# Patient Record
Sex: MALE | Race: WHITE | NOT HISPANIC OR LATINO
[De-identification: names, ages, dates, MRNs, and addresses within clinical notes are randomized per-mention and may not be internally consistent; named-entity substitution may affect disease eponyms.]

---

## 2017-01-19 ENCOUNTER — APPOINTMENT (OUTPATIENT)
Dept: ELECTROPHYSIOLOGY | Facility: CLINIC | Age: 82
End: 2017-01-19

## 2017-02-03 ENCOUNTER — RX RENEWAL (OUTPATIENT)
Age: 82
End: 2017-02-03

## 2017-02-06 ENCOUNTER — RX RENEWAL (OUTPATIENT)
Age: 82
End: 2017-02-06

## 2017-03-29 ENCOUNTER — APPOINTMENT (OUTPATIENT)
Dept: ELECTROPHYSIOLOGY | Facility: CLINIC | Age: 82
End: 2017-03-29

## 2017-03-29 ENCOUNTER — NON-APPOINTMENT (OUTPATIENT)
Age: 82
End: 2017-03-29

## 2017-03-29 ENCOUNTER — APPOINTMENT (OUTPATIENT)
Dept: CARDIOLOGY | Facility: CLINIC | Age: 82
End: 2017-03-29

## 2017-03-29 VITALS — SYSTOLIC BLOOD PRESSURE: 118 MMHG | DIASTOLIC BLOOD PRESSURE: 72 MMHG | WEIGHT: 155 LBS | BODY MASS INDEX: 21.62 KG/M2

## 2017-04-14 ENCOUNTER — MEDICATION RENEWAL (OUTPATIENT)
Age: 82
End: 2017-04-14

## 2017-05-04 ENCOUNTER — RX RENEWAL (OUTPATIENT)
Age: 82
End: 2017-05-04

## 2017-06-29 ENCOUNTER — APPOINTMENT (OUTPATIENT)
Dept: ELECTROPHYSIOLOGY | Facility: CLINIC | Age: 82
End: 2017-06-29

## 2017-08-04 ENCOUNTER — RX RENEWAL (OUTPATIENT)
Age: 82
End: 2017-08-04

## 2017-08-07 ENCOUNTER — RX RENEWAL (OUTPATIENT)
Age: 82
End: 2017-08-07

## 2017-08-09 ENCOUNTER — RX RENEWAL (OUTPATIENT)
Age: 82
End: 2017-08-09

## 2017-08-21 ENCOUNTER — APPOINTMENT (OUTPATIENT)
Dept: ELECTROPHYSIOLOGY | Facility: CLINIC | Age: 82
End: 2017-08-21
Payer: MEDICARE

## 2017-08-21 PROCEDURE — 93294 REM INTERROG EVL PM/LDLS PM: CPT

## 2017-10-11 ENCOUNTER — APPOINTMENT (OUTPATIENT)
Dept: ELECTROPHYSIOLOGY | Facility: CLINIC | Age: 82
End: 2017-10-11
Payer: MEDICARE

## 2017-10-11 ENCOUNTER — NON-APPOINTMENT (OUTPATIENT)
Age: 82
End: 2017-10-11

## 2017-10-11 ENCOUNTER — APPOINTMENT (OUTPATIENT)
Dept: CARDIOLOGY | Facility: CLINIC | Age: 82
End: 2017-10-11
Payer: MEDICARE

## 2017-10-11 VITALS
BODY MASS INDEX: 23.1 KG/M2 | SYSTOLIC BLOOD PRESSURE: 134 MMHG | HEIGHT: 71 IN | WEIGHT: 165 LBS | DIASTOLIC BLOOD PRESSURE: 76 MMHG | OXYGEN SATURATION: 100 % | HEART RATE: 62 BPM

## 2017-10-11 PROCEDURE — 99214 OFFICE O/P EST MOD 30 MIN: CPT

## 2017-10-11 PROCEDURE — 93280 PM DEVICE PROGR EVAL DUAL: CPT

## 2017-10-11 PROCEDURE — 93000 ELECTROCARDIOGRAM COMPLETE: CPT

## 2017-10-11 RX ORDER — METOPROLOL SUCCINATE 25 MG/1
25 TABLET, EXTENDED RELEASE ORAL
Qty: 90 | Refills: 3 | Status: DISCONTINUED | COMMUNITY
Start: 2017-04-14 | End: 2017-10-11

## 2017-11-07 ENCOUNTER — RX RENEWAL (OUTPATIENT)
Age: 82
End: 2017-11-07

## 2017-11-15 ENCOUNTER — OTHER (OUTPATIENT)
Age: 82
End: 2017-11-15

## 2017-11-15 RX ORDER — DILTIAZEM HYDROCHLORIDE 240 MG/1
240 CAPSULE, COATED, EXTENDED RELEASE ORAL
Qty: 90 | Refills: 0 | Status: DISCONTINUED | COMMUNITY
Start: 2017-10-11 | End: 2017-11-15

## 2018-01-22 ENCOUNTER — APPOINTMENT (OUTPATIENT)
Dept: ELECTROPHYSIOLOGY | Facility: CLINIC | Age: 83
End: 2018-01-22
Payer: MEDICARE

## 2018-01-22 PROCEDURE — 93294 REM INTERROG EVL PM/LDLS PM: CPT

## 2018-01-22 PROCEDURE — 93296 REM INTERROG EVL PM/IDS: CPT

## 2018-01-31 ENCOUNTER — RX RENEWAL (OUTPATIENT)
Age: 83
End: 2018-01-31

## 2018-02-05 ENCOUNTER — RX RENEWAL (OUTPATIENT)
Age: 83
End: 2018-02-05

## 2018-04-11 ENCOUNTER — APPOINTMENT (OUTPATIENT)
Dept: ELECTROPHYSIOLOGY | Facility: CLINIC | Age: 83
End: 2018-04-11
Payer: MEDICARE

## 2018-04-11 ENCOUNTER — APPOINTMENT (OUTPATIENT)
Dept: CARDIOLOGY | Facility: CLINIC | Age: 83
End: 2018-04-11
Payer: MEDICARE

## 2018-04-11 VITALS
WEIGHT: 155 LBS | OXYGEN SATURATION: 99 % | DIASTOLIC BLOOD PRESSURE: 68 MMHG | BODY MASS INDEX: 21.7 KG/M2 | SYSTOLIC BLOOD PRESSURE: 122 MMHG | HEART RATE: 89 BPM | HEIGHT: 71 IN

## 2018-04-11 PROCEDURE — 99214 OFFICE O/P EST MOD 30 MIN: CPT

## 2018-04-11 PROCEDURE — 93000 ELECTROCARDIOGRAM COMPLETE: CPT

## 2018-04-11 PROCEDURE — 93280 PM DEVICE PROGR EVAL DUAL: CPT

## 2018-04-12 ENCOUNTER — APPOINTMENT (OUTPATIENT)
Dept: CARDIOLOGY | Facility: CLINIC | Age: 83
End: 2018-04-12

## 2018-04-12 LAB
ALBUMIN SERPL ELPH-MCNC: 4.7 G/DL
ALP BLD-CCNC: 47 U/L
ALT SERPL-CCNC: 20 U/L
ANION GAP SERPL CALC-SCNC: 17 MMOL/L
AST SERPL-CCNC: 39 U/L
BILIRUB SERPL-MCNC: 0.5 MG/DL
BUN SERPL-MCNC: 13 MG/DL
CALCIUM SERPL-MCNC: 9.8 MG/DL
CHLORIDE SERPL-SCNC: 102 MMOL/L
CHOLEST SERPL-MCNC: 138 MG/DL
CHOLEST/HDLC SERPL: 1.7 RATIO
CO2 SERPL-SCNC: 24 MMOL/L
CREAT SERPL-MCNC: 1.1 MG/DL
GLUCOSE SERPL-MCNC: 107 MG/DL
HBA1C MFR BLD HPLC: 5.5 %
HDLC SERPL-MCNC: 83 MG/DL
LDLC SERPL CALC-MCNC: 45 MG/DL
NT-PROBNP SERPL-MCNC: 138 PG/ML
POTASSIUM SERPL-SCNC: 4.2 MMOL/L
PROT SERPL-MCNC: 7.8 G/DL
SODIUM SERPL-SCNC: 143 MMOL/L
TRIGL SERPL-MCNC: 52 MG/DL
TSH SERPL-ACNC: 1.74 UIU/ML

## 2018-07-19 ENCOUNTER — MEDICATION RENEWAL (OUTPATIENT)
Age: 83
End: 2018-07-19

## 2018-08-06 ENCOUNTER — APPOINTMENT (OUTPATIENT)
Dept: ELECTROPHYSIOLOGY | Facility: CLINIC | Age: 83
End: 2018-08-06

## 2018-10-11 ENCOUNTER — APPOINTMENT (OUTPATIENT)
Dept: ELECTROPHYSIOLOGY | Facility: CLINIC | Age: 83
End: 2018-10-11
Payer: MEDICARE

## 2018-10-11 ENCOUNTER — APPOINTMENT (OUTPATIENT)
Dept: CARDIOLOGY | Facility: CLINIC | Age: 83
End: 2018-10-11
Payer: MEDICARE

## 2018-10-11 ENCOUNTER — NON-APPOINTMENT (OUTPATIENT)
Age: 83
End: 2018-10-11

## 2018-10-11 VITALS
DIASTOLIC BLOOD PRESSURE: 61 MMHG | BODY MASS INDEX: 21.7 KG/M2 | SYSTOLIC BLOOD PRESSURE: 129 MMHG | HEART RATE: 71 BPM | OXYGEN SATURATION: 97 % | WEIGHT: 155 LBS | HEIGHT: 71 IN

## 2018-10-11 PROCEDURE — 99215 OFFICE O/P EST HI 40 MIN: CPT

## 2018-10-11 PROCEDURE — 99214 OFFICE O/P EST MOD 30 MIN: CPT

## 2018-10-11 PROCEDURE — 93280 PM DEVICE PROGR EVAL DUAL: CPT

## 2018-10-11 PROCEDURE — 93000 ELECTROCARDIOGRAM COMPLETE: CPT

## 2018-10-29 ENCOUNTER — APPOINTMENT (OUTPATIENT)
Dept: ELECTROPHYSIOLOGY | Facility: CLINIC | Age: 83
End: 2018-10-29
Payer: MEDICARE

## 2018-10-29 ENCOUNTER — RX RENEWAL (OUTPATIENT)
Age: 83
End: 2018-10-29

## 2018-10-29 ENCOUNTER — APPOINTMENT (OUTPATIENT)
Dept: CV DIAGNOSITCS | Facility: HOSPITAL | Age: 83
End: 2018-10-29

## 2018-10-29 ENCOUNTER — NON-APPOINTMENT (OUTPATIENT)
Age: 83
End: 2018-10-29

## 2018-10-29 ENCOUNTER — OUTPATIENT (OUTPATIENT)
Dept: OUTPATIENT SERVICES | Facility: HOSPITAL | Age: 83
LOS: 1 days | End: 2018-10-29
Payer: MEDICARE

## 2018-10-29 VITALS
DIASTOLIC BLOOD PRESSURE: 72 MMHG | HEIGHT: 71 IN | OXYGEN SATURATION: 97 % | HEART RATE: 68 BPM | BODY MASS INDEX: 22.4 KG/M2 | SYSTOLIC BLOOD PRESSURE: 144 MMHG | WEIGHT: 160 LBS

## 2018-10-29 DIAGNOSIS — I10 ESSENTIAL (PRIMARY) HYPERTENSION: ICD-10-CM

## 2018-10-29 PROCEDURE — 93306 TTE W/DOPPLER COMPLETE: CPT | Mod: 26

## 2018-10-29 PROCEDURE — 99214 OFFICE O/P EST MOD 30 MIN: CPT | Mod: 25

## 2018-10-29 PROCEDURE — 93306 TTE W/DOPPLER COMPLETE: CPT

## 2018-10-29 PROCEDURE — 93280 PM DEVICE PROGR EVAL DUAL: CPT

## 2018-10-29 PROCEDURE — 93000 ELECTROCARDIOGRAM COMPLETE: CPT | Mod: 59

## 2018-11-05 ENCOUNTER — RX RENEWAL (OUTPATIENT)
Age: 83
End: 2018-11-05

## 2019-02-07 ENCOUNTER — APPOINTMENT (OUTPATIENT)
Dept: CARDIOLOGY | Facility: CLINIC | Age: 84
End: 2019-02-07
Payer: MEDICARE

## 2019-02-07 ENCOUNTER — NON-APPOINTMENT (OUTPATIENT)
Age: 84
End: 2019-02-07

## 2019-02-07 VITALS
WEIGHT: 155 LBS | HEIGHT: 71 IN | SYSTOLIC BLOOD PRESSURE: 131 MMHG | DIASTOLIC BLOOD PRESSURE: 74 MMHG | OXYGEN SATURATION: 99 % | BODY MASS INDEX: 21.7 KG/M2 | HEART RATE: 78 BPM

## 2019-02-07 PROCEDURE — 99214 OFFICE O/P EST MOD 30 MIN: CPT

## 2019-02-07 PROCEDURE — 93000 ELECTROCARDIOGRAM COMPLETE: CPT

## 2019-02-07 NOTE — PHYSICAL EXAM
[General Appearance - Well Developed] : well developed [Normal Appearance] : normal appearance [Well Groomed] : well groomed [General Appearance - Well Nourished] : well nourished [No Deformities] : no deformities [General Appearance - In No Acute Distress] : no acute distress [Normal Conjunctiva] : the conjunctiva exhibited no abnormalities [Eyelids - No Xanthelasma] : the eyelids demonstrated no xanthelasmas [Normal Oral Mucosa] : normal oral mucosa [No Oral Pallor] : no oral pallor [No Oral Cyanosis] : no oral cyanosis [Normal Jugular Venous A Waves Present] : normal jugular venous A waves present [Normal Jugular Venous V Waves Present] : normal jugular venous V waves present [No Jugular Venous Langston A Waves] : no jugular venous langston A waves [Respiration, Rhythm And Depth] : normal respiratory rhythm and effort [Exaggerated Use Of Accessory Muscles For Inspiration] : no accessory muscle use [Auscultation Breath Sounds / Voice Sounds] : lungs were clear to auscultation bilaterally [Heart Rate And Rhythm] : heart rate and rhythm were normal [Heart Sounds] : normal S1 and S2 [Murmurs] : no murmurs present [Abdomen Soft] : soft [Abdomen Tenderness] : non-tender [Abdomen Mass (___ Cm)] : no abdominal mass palpated [Abnormal Walk] : normal gait [Gait - Sufficient For Exercise Testing] : the gait was sufficient for exercise testing [Nail Clubbing] : no clubbing of the fingernails [Cyanosis, Localized] : no localized cyanosis [Petechial Hemorrhages (___cm)] : no petechial hemorrhages [Skin Color & Pigmentation] : normal skin color and pigmentation [] : no rash [No Venous Stasis] : no venous stasis [Skin Lesions] : no skin lesions [No Skin Ulcers] : no skin ulcer [No Xanthoma] : no  xanthoma was observed [Oriented To Time, Place, And Person] : oriented to person, place, and time [Affect] : the affect was normal [Mood] : the mood was normal [No Anxiety] : not feeling anxious

## 2019-02-10 NOTE — HISTORY OF PRESENT ILLNESS
[FreeTextEntry1] : Garth is returning to the offices for a routine assessment.\par He is feeling weak and tired\par No chest pains or FLORES\par PPM programming changed - Seen by Dr. Newsome\par \par  \par

## 2019-02-10 NOTE — REVIEW OF SYSTEMS
[Feeling Fatigued] : feeling fatigued [Skin Lesions] : skin lesion(s): [Negative] : Heme/Lymph [Depression] : no depression [Suicidal] : not suicidal

## 2019-02-10 NOTE — DISCUSSION/SUMMARY
[___ Month(s)] : [unfilled] month(s) [FreeTextEntry1] : Mr. Armstrong is a 83 y/o with HTN, Hchol, PAF, PPM  here for a follow-up.  \par \par \par Will reduce beta blocker to 1/2 dose to assess if this is the cause of his symptoms\par Will contact me in 1 week to review how he feels\par May need PPM adjustment

## 2019-02-14 ENCOUNTER — OTHER (OUTPATIENT)
Age: 84
End: 2019-02-14

## 2019-04-20 ENCOUNTER — EMERGENCY (EMERGENCY)
Facility: HOSPITAL | Age: 84
LOS: 1 days | Discharge: HOME | End: 2019-04-20
Attending: EMERGENCY MEDICINE
Payer: MEDICARE

## 2019-04-20 VITALS
DIASTOLIC BLOOD PRESSURE: 81 MMHG | OXYGEN SATURATION: 97 % | SYSTOLIC BLOOD PRESSURE: 144 MMHG | HEART RATE: 71 BPM | TEMPERATURE: 98 F | RESPIRATION RATE: 16 BRPM

## 2019-04-20 VITALS
DIASTOLIC BLOOD PRESSURE: 69 MMHG | WEIGHT: 154.98 LBS | RESPIRATION RATE: 18 BRPM | SYSTOLIC BLOOD PRESSURE: 161 MMHG | HEIGHT: 71 IN | OXYGEN SATURATION: 98 % | HEART RATE: 63 BPM | TEMPERATURE: 98 F

## 2019-04-20 DIAGNOSIS — Z95.0 PRESENCE OF CARDIAC PACEMAKER: Chronic | ICD-10-CM

## 2019-04-20 LAB
ALBUMIN SERPL ELPH-MCNC: 4.5 G/DL — SIGNIFICANT CHANGE UP (ref 3.3–5)
ALP SERPL-CCNC: 41 U/L — SIGNIFICANT CHANGE UP (ref 40–120)
ALT FLD-CCNC: 18 U/L — SIGNIFICANT CHANGE UP (ref 10–45)
ANION GAP SERPL CALC-SCNC: 14 MMOL/L — SIGNIFICANT CHANGE UP (ref 5–17)
APTT BLD: 36.8 SEC — HIGH (ref 27.5–36.3)
AST SERPL-CCNC: 31 U/L — SIGNIFICANT CHANGE UP (ref 10–40)
BASOPHILS # BLD AUTO: 0.1 K/UL — SIGNIFICANT CHANGE UP (ref 0–0.2)
BASOPHILS NFR BLD AUTO: 0.6 % — SIGNIFICANT CHANGE UP (ref 0–2)
BILIRUB SERPL-MCNC: 0.5 MG/DL — SIGNIFICANT CHANGE UP (ref 0.2–1.2)
BUN SERPL-MCNC: 15 MG/DL — SIGNIFICANT CHANGE UP (ref 7–23)
CALCIUM SERPL-MCNC: 9.5 MG/DL — SIGNIFICANT CHANGE UP (ref 8.4–10.5)
CHLORIDE SERPL-SCNC: 104 MMOL/L — SIGNIFICANT CHANGE UP (ref 96–108)
CO2 SERPL-SCNC: 26 MMOL/L — SIGNIFICANT CHANGE UP (ref 22–31)
CREAT SERPL-MCNC: 1.01 MG/DL — SIGNIFICANT CHANGE UP (ref 0.5–1.3)
EOSINOPHIL # BLD AUTO: 0 K/UL — SIGNIFICANT CHANGE UP (ref 0–0.5)
EOSINOPHIL NFR BLD AUTO: 0.4 % — SIGNIFICANT CHANGE UP (ref 0–6)
GLUCOSE SERPL-MCNC: 114 MG/DL — HIGH (ref 70–99)
HCT VFR BLD CALC: 40.9 % — SIGNIFICANT CHANGE UP (ref 39–50)
HGB BLD-MCNC: 13.5 G/DL — SIGNIFICANT CHANGE UP (ref 13–17)
INR BLD: 1.86 RATIO — HIGH (ref 0.88–1.16)
LYMPHOCYTES # BLD AUTO: 1.5 K/UL — SIGNIFICANT CHANGE UP (ref 1–3.3)
LYMPHOCYTES # BLD AUTO: 18.7 % — SIGNIFICANT CHANGE UP (ref 13–44)
MCHC RBC-ENTMCNC: 30.8 PG — SIGNIFICANT CHANGE UP (ref 27–34)
MCHC RBC-ENTMCNC: 33.1 GM/DL — SIGNIFICANT CHANGE UP (ref 32–36)
MCV RBC AUTO: 93 FL — SIGNIFICANT CHANGE UP (ref 80–100)
MONOCYTES # BLD AUTO: 0.4 K/UL — SIGNIFICANT CHANGE UP (ref 0–0.9)
MONOCYTES NFR BLD AUTO: 5.3 % — SIGNIFICANT CHANGE UP (ref 2–14)
NEUTROPHILS # BLD AUTO: 5.9 K/UL — SIGNIFICANT CHANGE UP (ref 1.8–7.4)
NEUTROPHILS NFR BLD AUTO: 75 % — SIGNIFICANT CHANGE UP (ref 43–77)
PLATELET # BLD AUTO: 199 K/UL — SIGNIFICANT CHANGE UP (ref 150–400)
POTASSIUM SERPL-MCNC: 4.1 MMOL/L — SIGNIFICANT CHANGE UP (ref 3.5–5.3)
POTASSIUM SERPL-SCNC: 4.1 MMOL/L — SIGNIFICANT CHANGE UP (ref 3.5–5.3)
PROT SERPL-MCNC: 7.1 G/DL — SIGNIFICANT CHANGE UP (ref 6–8.3)
PROTHROM AB SERPL-ACNC: 21.8 SEC — HIGH (ref 10–12.9)
RBC # BLD: 4.39 M/UL — SIGNIFICANT CHANGE UP (ref 4.2–5.8)
RBC # FLD: 12.8 % — SIGNIFICANT CHANGE UP (ref 10.3–14.5)
SODIUM SERPL-SCNC: 144 MMOL/L — SIGNIFICANT CHANGE UP (ref 135–145)
TSH SERPL-MCNC: 1.65 UIU/ML — SIGNIFICANT CHANGE UP (ref 0.27–4.2)
WBC # BLD: 7.9 K/UL — SIGNIFICANT CHANGE UP (ref 3.8–10.5)
WBC # FLD AUTO: 7.9 K/UL — SIGNIFICANT CHANGE UP (ref 3.8–10.5)

## 2019-04-20 PROCEDURE — 71260 CT THORAX DX C+: CPT | Mod: 26

## 2019-04-20 PROCEDURE — 71260 CT THORAX DX C+: CPT

## 2019-04-20 PROCEDURE — 99284 EMERGENCY DEPT VISIT MOD MDM: CPT | Mod: 25

## 2019-04-20 PROCEDURE — 85610 PROTHROMBIN TIME: CPT

## 2019-04-20 PROCEDURE — 85730 THROMBOPLASTIN TIME PARTIAL: CPT

## 2019-04-20 PROCEDURE — 80053 COMPREHEN METABOLIC PANEL: CPT

## 2019-04-20 PROCEDURE — 84443 ASSAY THYROID STIM HORMONE: CPT

## 2019-04-20 PROCEDURE — 93281 PM DEVICE PROGR EVAL MULTI: CPT | Mod: 26,GC

## 2019-04-20 PROCEDURE — 84436 ASSAY OF TOTAL THYROXINE: CPT

## 2019-04-20 PROCEDURE — 93005 ELECTROCARDIOGRAM TRACING: CPT

## 2019-04-20 PROCEDURE — 71046 X-RAY EXAM CHEST 2 VIEWS: CPT

## 2019-04-20 PROCEDURE — 99284 EMERGENCY DEPT VISIT MOD MDM: CPT | Mod: GC

## 2019-04-20 PROCEDURE — 85027 COMPLETE CBC AUTOMATED: CPT

## 2019-04-20 PROCEDURE — 71046 X-RAY EXAM CHEST 2 VIEWS: CPT | Mod: 26

## 2019-04-20 NOTE — ED PROVIDER NOTE - PMH
CAD (coronary artery disease)    Gout    HLD (hyperlipidemia)    HTN (hypertension)    Paroxysmal atrial fibrillation

## 2019-04-20 NOTE — ED PROVIDER NOTE - CLINICAL SUMMARY MEDICAL DECISION MAKING FREE TEXT BOX
Attending MD Almeida:  PAF (on Eloquis), PPM, HTN, HLD, CAD, gout, SA lelo dysfunction presents with weakness or unsteady gait for the last few days.  He feels more weak than normal.  Similar in February and metoprolol dose reduced.  He also feels unsteady on his feet.  Family says mental status at baseline, no slurred speech or confusion. Not orthostatic on Vital signs. Attending MD Almeida: A & O x 3, NAD, HEENT WNL and no facial asymmetry; lungs CTAB, heart with reg rhythm without murmur; abdomen soft NTND; extremities with no edema; skin with no rashes, neuro exam non focal with no motor or sensory deficits. DDX  Hypothyroid, UTI vs other infection.  Plan:  PPM interrogation, labs, UA.

## 2019-04-20 NOTE — ED PROVIDER NOTE - OBJECTIVE STATEMENT
83 y/o M w/ PMH of paroxysmal a fib on eliquis, CAD, HLD, HTN, Gout presenting w/ weakness. Pt presents with multiple family members. Pt reports over the past 4 days 85 y/o M w/ PMH of paroxysmal a fib on eliquis, CAD, HLD, HTN, Gout presenting w/ weakness. Pt presents with multiple family members. Pt reports over the past 4 days has been having increased fatigue and weakness. States he is normally very active and does housework and yard work without much difficulty. Over this time though he has been unable to be as active as he usual and gets very tired. His fatigue is normally relieved with brief rest but that has not been the case lately. States he feel like he is not walking normally and feels off balance when turning while he walks. Family members report no change in speech or behavior. He reports his pacemaker settings being changed in October 2018 and since then he says has not quite felt himself. Saw his cardiologist Dr. Gaspar in February and his beta blocker dose was lowered in attempt to improve his symptoms. Pt states this has not helped. Not complaining of any pain. Recent sinus infection, but aside from that no other recent illnesses. No additional complaints.

## 2019-04-20 NOTE — ED ADULT NURSE REASSESSMENT NOTE - NS ED NURSE REASSESS COMMENT FT1
Pt reports he is comfortable at this time. Awaiting cardiology consult. Pt aware of plan of care. Safety and comfort measures provided.

## 2019-04-20 NOTE — ED ADULT NURSE REASSESSMENT NOTE - NS ED NURSE REASSESS COMMENT FT1
Report received from Natasha ARTIS . Pt AAOx4, NAD, resp nonlabored, skin warm/dry, resting comfortably in bed with family at bedside. Pt. endorses feeling a little flush after CT scan with contrast. NO signs of allergic reaction present.  Pt denies headache, dizziness, chest pain, palpitations, SOB, abd pain, n/v/d, urinary symptoms, fevers, chills, weakness at this time. Pt awaiting CT scan results . Safety maintained.

## 2019-04-20 NOTE — ED PROVIDER NOTE - NSFOLLOWUPCLINICS_GEN_ALL_ED_FT
[facility] Pulmonolgy and Sleep Medicine  Pulmonology  [address] [place] [address], [facility] [address]  [place] [address]  Phone: [phone]  Fax:   Follow Up Time: 7-10 Days

## 2019-04-20 NOTE — ED PROVIDER NOTE - ATTENDING CONTRIBUTION TO CARE
Attending MD Almeida:  I personally have seen and examined this patient.  Resident note reviewed and agree on plan of care and except where noted.  See MDM for details.

## 2019-04-20 NOTE — ED PROVIDER NOTE - NS ED ROS FT
GENERAL: No fever or chills. +fatigue, +weakness  EYES: No change in vision  HEENT: No trouble swallowing or speaking  CARDIAC: No chest pain  PULMONARY: No cough or SOB  GI: No abdominal pain, no nausea or no vomiting, no diarrhea or constipation  : No changes in urination  SKIN: No rashes  NEURO: No headache, no numbness  MSK: No joint pain  Otherwise as HPI or negative.

## 2019-04-20 NOTE — ED PROVIDER NOTE - PHYSICAL EXAMINATION
Gen: NAD, AOx3, able to make needs known, non-toxic  Head: NCAT  HEENT: EOMI, oral mucosa moist, normal conjunctiva  Lung: CTAB, no respiratory distress, no wheezes/rhonchi/rales B/L, speaking in full sentences  CV: irregular, no murmurs  Abd: soft, NTND, no guarding  MSK: no visible deformities  Neuro: CN 2-12 intact. Finger to nose normal. rapid alternating movement normal. Normal romberg. Gait normal w/o ataxia. Speech normal. No facial droop. No focal sensory or motor deficits  Skin: Warm, well perfused  Psych: normal affect

## 2019-04-20 NOTE — ED PROVIDER NOTE - PROGRESS NOTE DETAILS
Dr. Niko Flores, PGY-1: pacemaker interrogated by cards fellow. Reports it functioning correctly. Change made in October 2018 was to help preserve pacemaker battery life. CXR showing R sided pleural effusion. Concern for malignancy given pt's symptoms and this effusion. CT chest ordered to eval for cause of effusion. Garth Winston MD, FACEP patient stable, right sided pleural effusion is new and conveyed to patient and family. Patient with capacity and insight into situation, treatment, risks, benefits, alternative therapies, and understands they can ask any questions if needed.   CT chest noted with asbestos correlated findings, patient to  follow up with primary medical doctor. No immediate life threatening issues present on history or clinical exam. Patient is a safe disposition home, has capacity and insight into their condition, is ambulatory in the Emergency Department with no further questions and will follow up with their doctor(s) this week. Patient and family understand anticipatory guidance were given strict return and follow up precautions.  The patient and family have been informed of all concerning signs and symptoms to return to Emergency Department, the necessity to follow up with the PMD/Clinic/follow up provided within 2-3 days was explained, and the patient and/or family reports understanding of above with capacity and insight. Deloris Kirkland MD, PGY1: Patient received at resident sign out. Pt feels better, results of the CT findings were discussed with the pt and the family. Pt will be followed up by PCP and/or pulmonologist. I have given the pt strict return and follow up precautions. The patient has been provided with a copy of all pertinent results. The patient has been informed of all concerning signs and symptoms to return to Emergency Department, the necessity to follow up with PMD/Clinic/follow up provided within 2-3 days was explained, and the patient reports understanding of above with capacity and insight.

## 2019-04-20 NOTE — ED ADULT NURSE REASSESSMENT NOTE - NS ED NURSE REASSESS COMMENT FT1
Pt aware urine is needed at this time. Awaiting lab results. Safety and comfort measures maintained. Pt denies any pain at this time. Call bell within reach. Family at bedside.

## 2019-04-21 NOTE — PROCEDURE NOTE - INTERROGATION NOTE: COMMENTS
1. No arrhythmias 2. Impedance of Ventricular Lead high (2073) and stable/known 1. No arrhythmias 2. Impedance of Ventricular Lead high (2073) and stable/known 3. No programming performed

## 2019-04-22 ENCOUNTER — OTHER (OUTPATIENT)
Age: 84
End: 2019-04-22

## 2019-04-22 ENCOUNTER — NON-APPOINTMENT (OUTPATIENT)
Age: 84
End: 2019-04-22

## 2019-04-22 ENCOUNTER — APPOINTMENT (OUTPATIENT)
Dept: ELECTROPHYSIOLOGY | Facility: CLINIC | Age: 84
End: 2019-04-22
Payer: MEDICARE

## 2019-04-22 ENCOUNTER — APPOINTMENT (OUTPATIENT)
Dept: CARDIOLOGY | Facility: CLINIC | Age: 84
End: 2019-04-22
Payer: MEDICARE

## 2019-04-22 VITALS
HEIGHT: 71 IN | WEIGHT: 144 LBS | DIASTOLIC BLOOD PRESSURE: 70 MMHG | HEART RATE: 64 BPM | OXYGEN SATURATION: 98 % | SYSTOLIC BLOOD PRESSURE: 128 MMHG | BODY MASS INDEX: 20.16 KG/M2

## 2019-04-22 PROBLEM — I10 ESSENTIAL (PRIMARY) HYPERTENSION: Chronic | Status: ACTIVE | Noted: 2019-04-20

## 2019-04-22 PROBLEM — I25.10 ATHEROSCLEROTIC HEART DISEASE OF NATIVE CORONARY ARTERY WITHOUT ANGINA PECTORIS: Chronic | Status: ACTIVE | Noted: 2019-04-20

## 2019-04-22 PROBLEM — E78.5 HYPERLIPIDEMIA, UNSPECIFIED: Chronic | Status: ACTIVE | Noted: 2019-04-20

## 2019-04-22 PROBLEM — M10.9 GOUT, UNSPECIFIED: Chronic | Status: ACTIVE | Noted: 2019-04-20

## 2019-04-22 LAB
ALBUMIN SERPL ELPH-MCNC: 4.8 G/DL
ALP BLD-CCNC: 51 U/L
ALT SERPL-CCNC: 19 U/L
ANION GAP SERPL CALC-SCNC: 12 MMOL/L
AST SERPL-CCNC: 31 U/L
BILIRUB SERPL-MCNC: 0.5 MG/DL
BUN SERPL-MCNC: 16 MG/DL
CALCIUM SERPL-MCNC: 9.5 MG/DL
CHLORIDE SERPL-SCNC: 105 MMOL/L
CK SERPL-CCNC: 133 U/L
CO2 SERPL-SCNC: 27 MMOL/L
CREAT SERPL-MCNC: 0.97 MG/DL
ERYTHROCYTE [SEDIMENTATION RATE] IN BLOOD BY WESTERGREN METHOD: 21 MM/HR
GLUCOSE SERPL-MCNC: 108 MG/DL
POTASSIUM SERPL-SCNC: 4.1 MMOL/L
PROT SERPL-MCNC: 7.2 G/DL
SODIUM SERPL-SCNC: 144 MMOL/L
TSH SERPL-ACNC: 1.61 UIU/ML

## 2019-04-22 PROCEDURE — 99214 OFFICE O/P EST MOD 30 MIN: CPT

## 2019-04-22 PROCEDURE — 93280 PM DEVICE PROGR EVAL DUAL: CPT

## 2019-04-22 PROCEDURE — 93000 ELECTROCARDIOGRAM COMPLETE: CPT

## 2019-04-22 NOTE — PHYSICAL EXAM
[General Appearance - Well Developed] : well developed [Normal Appearance] : normal appearance [Well Groomed] : well groomed [No Deformities] : no deformities [General Appearance - Well Nourished] : well nourished [General Appearance - In No Acute Distress] : no acute distress [Normal Conjunctiva] : the conjunctiva exhibited no abnormalities [Eyelids - No Xanthelasma] : the eyelids demonstrated no xanthelasmas [Normal Oral Mucosa] : normal oral mucosa [No Oral Pallor] : no oral pallor [Normal Jugular Venous A Waves Present] : normal jugular venous A waves present [No Oral Cyanosis] : no oral cyanosis [No Jugular Venous Langston A Waves] : no jugular venous langston A waves [Normal Jugular Venous V Waves Present] : normal jugular venous V waves present [Respiration, Rhythm And Depth] : normal respiratory rhythm and effort [Exaggerated Use Of Accessory Muscles For Inspiration] : no accessory muscle use [Heart Rate And Rhythm] : heart rate and rhythm were normal [Heart Sounds] : normal S1 and S2 [Auscultation Breath Sounds / Voice Sounds] : lungs were clear to auscultation bilaterally [Murmurs] : no murmurs present [Abdomen Soft] : soft [Abdomen Tenderness] : non-tender [Abdomen Mass (___ Cm)] : no abdominal mass palpated [Abnormal Walk] : normal gait [Gait - Sufficient For Exercise Testing] : the gait was sufficient for exercise testing [Nail Clubbing] : no clubbing of the fingernails [Petechial Hemorrhages (___cm)] : no petechial hemorrhages [Cyanosis, Localized] : no localized cyanosis [Skin Color & Pigmentation] : normal skin color and pigmentation [] : no rash [No Venous Stasis] : no venous stasis [Skin Lesions] : no skin lesions [No Skin Ulcers] : no skin ulcer [No Xanthoma] : no  xanthoma was observed [Oriented To Time, Place, And Person] : oriented to person, place, and time [Affect] : the affect was normal [Mood] : the mood was normal [No Anxiety] : not feeling anxious

## 2019-04-26 NOTE — REASON FOR VISIT
[Follow-Up - Clinic] : a clinic follow-up of [Atrial Fibrillation] : atrial fibrillation [Hypertension] : hypertension [Medication Management] : Medication management [Dizziness] : dizziness [Fatigue] : feeling tired (fatigue)

## 2019-04-26 NOTE — HISTORY OF PRESENT ILLNESS
[FreeTextEntry1] : Garth is returning feeling weak in his legs and fatigue (happens episodically, but has happened more often)\par No syncope\par But family has had to bring him to the ER recently because he was confused and thought he was having a stroke)\par No CP\par No new meds\par No Le edema\par No orthopnea\par \par PPM check reveals episodes of AF and noncapture\par \par \par  \par

## 2019-04-26 NOTE — DISCUSSION/SUMMARY
[___ Month(s)] : [unfilled] month(s) [FreeTextEntry1] : Mr. Armstrong is a 85 y/o with HTN, Hchol, PAF, PPM  here for a follow-up - not feeling well with fatigue, dizziness and leg weakness\par \par 1- stop lipitor for 4 weeks\par 2- check labs\par 3- given PPM check will stop multaq and dig and change to Amio (load reviewed with pt)\par 4- f/u with Jerod for lead extraction and re-implant\par 5- F/u 4 weeks\par

## 2019-04-29 ENCOUNTER — APPOINTMENT (OUTPATIENT)
Dept: ELECTROPHYSIOLOGY | Facility: CLINIC | Age: 84
End: 2019-04-29
Payer: MEDICARE

## 2019-04-29 ENCOUNTER — MEDICATION RENEWAL (OUTPATIENT)
Age: 84
End: 2019-04-29

## 2019-04-29 VITALS
DIASTOLIC BLOOD PRESSURE: 80 MMHG | SYSTOLIC BLOOD PRESSURE: 150 MMHG | WEIGHT: 145 LBS | OXYGEN SATURATION: 98 % | BODY MASS INDEX: 20.3 KG/M2 | HEART RATE: 77 BPM | HEIGHT: 71 IN

## 2019-04-29 PROCEDURE — 93280 PM DEVICE PROGR EVAL DUAL: CPT

## 2019-04-29 PROCEDURE — 93000 ELECTROCARDIOGRAM COMPLETE: CPT | Mod: 59

## 2019-04-29 PROCEDURE — 99214 OFFICE O/P EST MOD 30 MIN: CPT | Mod: 25

## 2019-05-04 NOTE — REASON FOR VISIT
[Pacemaker Evaluation] : pacemaker ~T evaluation ~C was performed [Follow-Up - Clinic] : a clinic follow-up of [Atrial Fibrillation] : atrial fibrillation

## 2019-05-04 NOTE — HISTORY OF PRESENT ILLNESS
[FreeTextEntry1] : I had the pleasure of seeing your patient Garth Armstrong today in the arrhythmia clinic of [facility]. As you well know the patient is an 84-year-old gentleman with tachybradycardia syndrome status post pacemaker placement. The patient underwent initial pacemaker implantation on January 4, 2011. In followup he had been doing well but it has been noticed that he has progressive increases in both his impedance and threshold on the ventricular lead. This is true in both unipolar and bipolar mode. Historically he has been having age of fibrillation in the mid teens as an overall percentage which appears to be fairly asymptomatic. He is on Xarelto for anticoagulation. He comes in today for consultation concerning how to manage his ventricular pacing lead. \par \par I had seen him last the patient's RV lead has a very high threshold but with atrial pacing he had one-to-one conduction up to 90 beats per minute. . He turns to clinic today for followup.\par \par Today in clinic he is overall doing well. His blood pressure 120/70 his pulse was 64 and regular. His EKG demonstrated atrial pacing and intact conduction with a OH interval of 178 beats per minute. Apparently when he goes slow he has been doing some ventricular pacing and atrial fibrillation. We therefore changed his AV delay to decrease V. pacing in sinus rhythm and will switch him to amiodarone as opposed to Dronadarone to try and maintain sinus rhythm and prevent him from having recurrent slow heart rates during Af and requiring V. pacing. Interrogation of his Medtronic dual-chamber pacemaker demonstrates a battery longevity of 21 months. The P wave was undetectable as he was paced. The impedance was 390 ohms and threshold was 0.375 V at 0.4 ms. The RV threshold was 16 mV with impedance of 2227 ohms.

## 2019-05-04 NOTE — DISCUSSION/SUMMARY
[FreeTextEntry1] : In summary the patient is an 84-year-old gentleman with tachybradycardia syndrome. His RV lead is failing. While he is in sinus rhythm he is atrially paced and does well. We took the liberty of switching him to amiodarone to try and maintain sinus rhythm and avoid V. pacing. If however he continues to have higher incidences of atrial fibrillation and bradycardia her hand we'll be forced to revise his system and had a ventricular lead. The patient is hopeful this is not the case because he very much wants to avoid any procedures.

## 2019-06-18 ENCOUNTER — APPOINTMENT (OUTPATIENT)
Dept: PULMONOLOGY | Facility: CLINIC | Age: 84
End: 2019-06-18
Payer: MEDICARE

## 2019-06-18 VITALS
OXYGEN SATURATION: 98 % | BODY MASS INDEX: 20.3 KG/M2 | HEART RATE: 68 BPM | WEIGHT: 145 LBS | HEIGHT: 71 IN | SYSTOLIC BLOOD PRESSURE: 140 MMHG | DIASTOLIC BLOOD PRESSURE: 70 MMHG

## 2019-06-18 DIAGNOSIS — A15.0 TUBERCULOSIS OF LUNG: ICD-10-CM

## 2019-06-18 DIAGNOSIS — M10.9 GOUT, UNSPECIFIED: ICD-10-CM

## 2019-06-18 DIAGNOSIS — Z85.828 PERSONAL HISTORY OF OTHER MALIGNANT NEOPLASM OF SKIN: ICD-10-CM

## 2019-06-18 PROCEDURE — 94729 DIFFUSING CAPACITY: CPT

## 2019-06-18 PROCEDURE — 94727 GAS DIL/WSHOT DETER LNG VOL: CPT

## 2019-06-18 PROCEDURE — 85018 HEMOGLOBIN: CPT | Mod: QW

## 2019-06-18 PROCEDURE — 99204 OFFICE O/P NEW MOD 45 MIN: CPT | Mod: 25

## 2019-06-18 PROCEDURE — 94010 BREATHING CAPACITY TEST: CPT

## 2019-06-18 NOTE — PROCEDURE
[FreeTextEntry1] : PFT: no obstruction. no restriction. DLCO normal. \par ------------------\par EXAM: CT CHEST IC \par \par \par PROCEDURE DATE: 04/20/2019 \par \par \par \par \par INTERPRETATION: CT CHEST WITH CONTRAST \par \par INDICATION: Weakness and fatigue for months. Evaluate for pleural effusion. \par \par TECHNIQUE: Enhanced helical images were obtained of the chest. Coronal and \par sagittal images were reconstructed. Maximum intensity projection images were \par generated. Images were obtained after the uneventful administration of 90 cc \par nonionic intravenous Omnipaque 350. 10 cc of Omnipaque 350 was discarded. \par \par COMPARISON: None. \par \par FINDINGS: \par \par Lungs And Airways: Central airways are patent. Sub-cm partially calcified \par subpleural based nodule in the left upper lobe(2:37). Left fissural \par calcified granuloma. Left costophrenic sulcus is not completely imaged. Mild \par blunting of the right costal phrenic sulcus, likely related to scarring. \par \par Pleura: Right basilar calcified pleural plaques, likely in keeping with \par prior asbestosis exposure. No pneumothorax or pleural effusion. \par \par Mediastinum: There are no enlarged chest lymph nodes. The visualized portion \par of the thyroid gland is unremarkable. \par \par Heart and Vasculature: Coronary artery and valvular calcifications. \par Left-sided dual-chamber cardiac pacemaker identified. No pericardial \par effusion. The main pulmonary artery is normal in caliber at the level of \par bifurcation. Atherosclerotic calcifications of the aorta. \par \par Upper Abdomen: Mild intra and hepatic biliary dilatation, likely related to \par cholecystectomy. Too small to characterize left hepatic lobe hypodensities. \par Partially imaged small right renal and parapelvic cyst. \par \par Bones And Soft Tissues: Degenerative changes of the spine. \par \par IMPRESSION: \par \par No consolidation or pleural effusion. \par \par Right basilar calcified pleural plaques, likely in keeping with prior \par asbestosis exposure. Sub-cm partially calcified subpleural based nodule in \par the left upper lobe. Short-term 6 month pulmonary imaging follow-up is \par advised demonstrate stability. \par \par Additional findings as mentioned above. \par \par \par \par \par \par \par \par GEOFF MAYS M.D., RADIOLOGY RESIDENT \par This document has been electronically signed. \par KYRA HARRISON M.D., ATTENDING RADIOLOGIST \par This document has been electronically signed. Apr 20 2019 7:47PM \par \par \par

## 2019-06-18 NOTE — ASSESSMENT
[FreeTextEntry1] : Asbestos exposure w/o evidence of asbestosis and normal lung volumes. Pleural plaques seen. Normal lung function. 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 Some of the scar tissue seen may be result of this. No signs or symptoms of active infection.

## 2019-06-18 NOTE — PHYSICAL EXAM
[Normal Conjunctiva] : the conjunctiva exhibited no abnormalities [Neck Appearance] : the appearance of the neck was normal [Normal Oropharynx] : normal oropharynx [] : the neck was supple [Heart Rate And Rhythm] : heart rate and rhythm were normal [Heart Sounds] : normal S1 and S2 [Normal Rate] : the respiratory rate was normal [Rate ___] : at [unfilled] breaths per minute [Clear Bilaterally] : the lungs were clear to auscultation bilaterally [Normal Breath Sounds] : normal bilateral breath sounds [Bowel Sounds] : normal bowel sounds [Abdomen Soft] : soft [Abnormal Walk] : normal gait [Nail Clubbing] : no clubbing of the fingernails [Cyanosis, Localized] : no localized cyanosis [Skin Color & Pigmentation] : normal skin color and pigmentation [Oriented To Time, Place, And Person] : oriented to person, place, and time [No Focal Deficits] : no focal deficits [Impaired Insight] : insight and judgment were intact [Affect] : the affect was normal [FreeTextEntry1] : systolic murmur

## 2019-06-18 NOTE — HISTORY OF PRESENT ILLNESS
[FreeTextEntry1] : In April 20 went to [facility] b/c not feeling well. Imaging done of lung was abnormal. Told possible asbestos exoposure.\par \jatinder Denies any sob, wheeze, cough, CP. \jatinder ballesteros Was recently started on amiodarone by Dr Mcmillan for afib in April. \jatinder ballesteros Was in the [employer] for 3.5 years in [employer]. 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 Also exposed to while working for [employer]. Was in the ceiling. Not been near asbestos for about 20 yrs.\jatinder ballesteros Also, while in the [place] had an abnormality on his lung in the 1950s. Was in the hospital for four months. Tried drainage. 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 He is not sure of what the treatment was. \par \par Lifelong nonsmoker.

## 2019-06-24 ENCOUNTER — RX RENEWAL (OUTPATIENT)
Age: 84
End: 2019-06-24

## 2019-07-22 ENCOUNTER — RX RENEWAL (OUTPATIENT)
Age: 84
End: 2019-07-22

## 2019-07-29 ENCOUNTER — APPOINTMENT (OUTPATIENT)
Dept: ELECTROPHYSIOLOGY | Facility: CLINIC | Age: 84
End: 2019-07-29
Payer: MEDICARE

## 2019-07-29 ENCOUNTER — NON-APPOINTMENT (OUTPATIENT)
Age: 84
End: 2019-07-29

## 2019-07-29 VITALS
DIASTOLIC BLOOD PRESSURE: 77 MMHG | HEIGHT: 71 IN | SYSTOLIC BLOOD PRESSURE: 135 MMHG | WEIGHT: 148 LBS | OXYGEN SATURATION: 98 % | BODY MASS INDEX: 20.72 KG/M2 | HEART RATE: 73 BPM

## 2019-07-29 DIAGNOSIS — I49.5 SICK SINUS SYNDROME: ICD-10-CM

## 2019-07-29 PROCEDURE — 93000 ELECTROCARDIOGRAM COMPLETE: CPT | Mod: 59

## 2019-07-29 PROCEDURE — 93280 PM DEVICE PROGR EVAL DUAL: CPT

## 2019-07-29 PROCEDURE — 99214 OFFICE O/P EST MOD 30 MIN: CPT | Mod: 25

## 2019-07-29 RX ORDER — DIGOXIN 250 UG/ML
0.25 INJECTION, SOLUTION INTRAMUSCULAR; INTRAVENOUS; PARENTERAL
Refills: 0 | Status: DISCONTINUED | COMMUNITY
End: 2019-07-29

## 2019-08-07 NOTE — HISTORY OF PRESENT ILLNESS
[FreeTextEntry1] : I had the pleasure of seeing your patient Garth Armstrong today in the arrhythmia clinic of [facility]. As you well know the patient is an 84-year-old gentleman with tachybradycardia syndrome status post pacemaker placement. The patient underwent initial pacemaker implantation on January 4, 2011. In followup he had been doing well but it has been noticed that he has progressive increases in both his impedance and threshold on the ventricular lead. This is true in both unipolar and bipolar mode. Historically he has been having age of fibrillation in the mid teens as an overall percentage which appears to be fairly asymptomatic. He is on Xarelto for anticoagulation.\par \par The patient's RV lead has a very high threshold but with atrial pacing he had one-to-one conduction up to 90 beats per minute. We changed his AV delay to decrease V. pacing in sinus rhythm and switched him to amiodarone as opposed to Dronadarone to try and maintain sinus rhythm and prevent him from having recurrent slow heart rates during Af and requiring V. pacing. O. Overall he reports doing fairly well. He does have periods of fatigue his blood pressure he was on and 35/77 and his pulse was 73 and regular. His EKG demonstrates a pacing V. sensing with left atrial enlargement and old anterior of my. Interrogation of his dual-chamber pacemaker reveals that the battery voltage is approaching CHARITY and his RV lead still does not work.He is having intermittent episodes of atrial fibrillation with slow ventricular response. Although these are reduced on amiodarone he is still having them.

## 2019-08-07 NOTE — REASON FOR VISIT
[Follow-Up - Clinic] : a clinic follow-up of [Atrial Fibrillation] : atrial fibrillation [Pacemaker Evaluation] : pacemaker ~T evaluation ~C was performed [Spouse] : spouse

## 2019-08-07 NOTE — DISCUSSION/SUMMARY
[FreeTextEntry1] : In summary the patient is an 85-year-old gentleman with tachybradycardia syndrome and a pacemaker with a failed RV lead. At this point we discussed the options of continuing on as he is versus trying to add a lead and generator change so he can have backup RV pacing when he goes into to fibrillation and go slow. I am wondering if this accounts for the times he feels fatigued. After discussing the procedures risks and outcomes the patient is amenable to proceeding and we will make arrangements to replace his Medtronic pacemaker and add an RV pacing lead. We will hold his Xarelto for 2 days prior.

## 2019-08-15 ENCOUNTER — MEDICATION RENEWAL (OUTPATIENT)
Age: 84
End: 2019-08-15

## 2019-08-29 ENCOUNTER — APPOINTMENT (OUTPATIENT)
Dept: ELECTROPHYSIOLOGY | Facility: CLINIC | Age: 84
End: 2019-08-29

## 2019-09-05 ENCOUNTER — OUTPATIENT (OUTPATIENT)
Dept: OUTPATIENT SERVICES | Facility: HOSPITAL | Age: 84
LOS: 1 days | End: 2019-09-05
Payer: MEDICARE

## 2019-09-05 VITALS
RESPIRATION RATE: 14 BRPM | WEIGHT: 145.06 LBS | TEMPERATURE: 98 F | OXYGEN SATURATION: 99 % | HEART RATE: 86 BPM | SYSTOLIC BLOOD PRESSURE: 168 MMHG | HEIGHT: 71 IN | DIASTOLIC BLOOD PRESSURE: 76 MMHG

## 2019-09-05 DIAGNOSIS — Z01.818 ENCOUNTER FOR OTHER PREPROCEDURAL EXAMINATION: ICD-10-CM

## 2019-09-05 DIAGNOSIS — I48.91 UNSPECIFIED ATRIAL FIBRILLATION: ICD-10-CM

## 2019-09-05 DIAGNOSIS — Z95.0 PRESENCE OF CARDIAC PACEMAKER: Chronic | ICD-10-CM

## 2019-09-05 LAB
ALBUMIN SERPL ELPH-MCNC: 4.9 G/DL — SIGNIFICANT CHANGE UP (ref 3.3–5)
ALP SERPL-CCNC: 52 U/L — SIGNIFICANT CHANGE UP (ref 40–120)
ALT FLD-CCNC: 17 U/L — SIGNIFICANT CHANGE UP (ref 10–45)
ANION GAP SERPL CALC-SCNC: 13 MMOL/L — SIGNIFICANT CHANGE UP (ref 5–17)
APTT BLD: 39.5 SEC — HIGH (ref 27.5–36.3)
AST SERPL-CCNC: 29 U/L — SIGNIFICANT CHANGE UP (ref 10–40)
BILIRUB SERPL-MCNC: 0.4 MG/DL — SIGNIFICANT CHANGE UP (ref 0.2–1.2)
BLD GP AB SCN SERPL QL: NEGATIVE — SIGNIFICANT CHANGE UP
BUN SERPL-MCNC: 12 MG/DL — SIGNIFICANT CHANGE UP (ref 7–23)
CALCIUM SERPL-MCNC: 9.9 MG/DL — SIGNIFICANT CHANGE UP (ref 8.4–10.5)
CHLORIDE SERPL-SCNC: 99 MMOL/L — SIGNIFICANT CHANGE UP (ref 96–108)
CO2 SERPL-SCNC: 27 MMOL/L — SIGNIFICANT CHANGE UP (ref 22–31)
CREAT SERPL-MCNC: 1.07 MG/DL — SIGNIFICANT CHANGE UP (ref 0.5–1.3)
GLUCOSE SERPL-MCNC: 120 MG/DL — HIGH (ref 70–99)
HCT VFR BLD CALC: 40.9 % — SIGNIFICANT CHANGE UP (ref 39–50)
HGB BLD-MCNC: 13.5 G/DL — SIGNIFICANT CHANGE UP (ref 13–17)
INR BLD: 1.52 RATIO — HIGH (ref 0.88–1.16)
MCHC RBC-ENTMCNC: 30.9 PG — SIGNIFICANT CHANGE UP (ref 27–34)
MCHC RBC-ENTMCNC: 33 GM/DL — SIGNIFICANT CHANGE UP (ref 32–36)
MCV RBC AUTO: 93.6 FL — SIGNIFICANT CHANGE UP (ref 80–100)
PLATELET # BLD AUTO: 193 K/UL — SIGNIFICANT CHANGE UP (ref 150–400)
POTASSIUM SERPL-MCNC: 4.3 MMOL/L — SIGNIFICANT CHANGE UP (ref 3.5–5.3)
POTASSIUM SERPL-SCNC: 4.3 MMOL/L — SIGNIFICANT CHANGE UP (ref 3.5–5.3)
PROT SERPL-MCNC: 7.7 G/DL — SIGNIFICANT CHANGE UP (ref 6–8.3)
PROTHROM AB SERPL-ACNC: 17.6 SEC — HIGH (ref 10–12.9)
RBC # BLD: 4.37 M/UL — SIGNIFICANT CHANGE UP (ref 4.2–5.8)
RBC # FLD: 12.9 % — SIGNIFICANT CHANGE UP (ref 10.3–14.5)
RH IG SCN BLD-IMP: POSITIVE — SIGNIFICANT CHANGE UP
SODIUM SERPL-SCNC: 139 MMOL/L — SIGNIFICANT CHANGE UP (ref 135–145)
WBC # BLD: 5.9 K/UL — SIGNIFICANT CHANGE UP (ref 3.8–10.5)
WBC # FLD AUTO: 5.9 K/UL — SIGNIFICANT CHANGE UP (ref 3.8–10.5)

## 2019-09-05 PROCEDURE — 93010 ELECTROCARDIOGRAM REPORT: CPT

## 2019-09-05 RX ORDER — SODIUM CHLORIDE 9 MG/ML
3 INJECTION INTRAMUSCULAR; INTRAVENOUS; SUBCUTANEOUS EVERY 8 HOURS
Refills: 0 | Status: DISCONTINUED | OUTPATIENT
Start: 2019-09-05 | End: 2019-09-21

## 2019-09-05 NOTE — H&P CARDIOLOGY - HISTORY OF PRESENT ILLNESS
86 yo  male PMH tachybradycardia syndrome status post pacemaker placement (initial pacemaker implantation on January 4, 2011, medtronic), pAFIB (on xarelto 9/4/49), HTN, HLD presents today for presurgical testing for scheduled generator and lead change scheduled on 9/6/19. Patient seen and evaluated by Dr. Newsome, pacemaker with a failed RV lead, plan is to replace his Medtronic pacemaker and add an RV pacing lead    Preop instructions given to pt for PM gen change and new RV lead scheduled on 9/6. Hold Xarelto 2 days prior to procedure. NPO after MN the night before.     cards: Dr. Gaspar

## 2019-09-05 NOTE — H&P CARDIOLOGY - PMH
Asbestos exposure    CAD (coronary artery disease)    Gout    HLD (hyperlipidemia)    HTN (hypertension)    Lung nodule    Paroxysmal atrial fibrillation    Skin cancer  Basal cell  removed  TB (tuberculosis)

## 2019-09-06 ENCOUNTER — TRANSCRIPTION ENCOUNTER (OUTPATIENT)
Age: 84
End: 2019-09-06

## 2019-09-06 ENCOUNTER — INPATIENT (INPATIENT)
Facility: HOSPITAL | Age: 84
LOS: 0 days | Discharge: HOME | DRG: 244 | End: 2019-09-07
Attending: INTERNAL MEDICINE | Admitting: INTERNAL MEDICINE
Payer: MEDICARE

## 2019-09-06 VITALS
RESPIRATION RATE: 18 BRPM | HEIGHT: 71 IN | WEIGHT: 145.06 LBS | DIASTOLIC BLOOD PRESSURE: 75 MMHG | OXYGEN SATURATION: 98 % | HEART RATE: 68 BPM | SYSTOLIC BLOOD PRESSURE: 156 MMHG | TEMPERATURE: 98 F

## 2019-09-06 DIAGNOSIS — Z95.0 PRESENCE OF CARDIAC PACEMAKER: Chronic | ICD-10-CM

## 2019-09-06 DIAGNOSIS — I48.91 UNSPECIFIED ATRIAL FIBRILLATION: ICD-10-CM

## 2019-09-06 LAB
BLD GP AB SCN SERPL QL: NEGATIVE — SIGNIFICANT CHANGE UP
RH IG SCN BLD-IMP: POSITIVE — SIGNIFICANT CHANGE UP

## 2019-09-06 PROCEDURE — 93010 ELECTROCARDIOGRAM REPORT: CPT

## 2019-09-06 PROCEDURE — 71045 X-RAY EXAM CHEST 1 VIEW: CPT | Mod: 26

## 2019-09-06 PROCEDURE — 33214 UPGRADE OF PACEMAKER SYSTEM: CPT

## 2019-09-06 RX ORDER — AMIODARONE HYDROCHLORIDE 400 MG/1
200 TABLET ORAL DAILY
Refills: 0 | Status: DISCONTINUED | OUTPATIENT
Start: 2019-09-06 | End: 2019-09-07

## 2019-09-06 RX ORDER — LISINOPRIL 2.5 MG/1
20 TABLET ORAL DAILY
Refills: 0 | Status: DISCONTINUED | OUTPATIENT
Start: 2019-09-06 | End: 2019-09-07

## 2019-09-06 RX ORDER — DIGOXIN 250 MCG
0.25 TABLET ORAL DAILY
Refills: 0 | Status: DISCONTINUED | OUTPATIENT
Start: 2019-09-06 | End: 2019-09-07

## 2019-09-06 RX ORDER — CEFAZOLIN SODIUM 1 G
1000 VIAL (EA) INJECTION EVERY 8 HOURS
Refills: 0 | Status: COMPLETED | OUTPATIENT
Start: 2019-09-06 | End: 2019-09-07

## 2019-09-06 RX ORDER — ALLOPURINOL 300 MG
300 TABLET ORAL DAILY
Refills: 0 | Status: DISCONTINUED | OUTPATIENT
Start: 2019-09-06 | End: 2019-09-07

## 2019-09-06 RX ORDER — AMLODIPINE BESYLATE 2.5 MG/1
5 TABLET ORAL DAILY
Refills: 0 | Status: DISCONTINUED | OUTPATIENT
Start: 2019-09-06 | End: 2019-09-07

## 2019-09-06 RX ADMIN — AMIODARONE HYDROCHLORIDE 200 MILLIGRAM(S): 400 TABLET ORAL at 20:15

## 2019-09-06 RX ADMIN — Medication 300 MILLIGRAM(S): at 16:28

## 2019-09-06 RX ADMIN — Medication 0.25 MILLIGRAM(S): at 16:28

## 2019-09-06 RX ADMIN — Medication 100 MILLIGRAM(S): at 20:14

## 2019-09-06 NOTE — DISCHARGE NOTE PROVIDER - HOSPITAL COURSE
86 yo  male PMH tachybradycardia syndrome status post pacemaker placement (initial pacemaker implantation on January 4, 2011, Medtronic), pAFIB (on Xarelto 9/4/49), HTN, HLD presents today for presurgical testing for scheduled generator and lead change scheduled on 9/6/19. Patient seen and evaluated by Dr. Newsome, pacemaker with a failed RV lead, plan is to replace his Medtronic pacemaker and add an RV pacing lead. Pt is now s/p dual gen change with led revision via LACW. Pt tolerated the procedure well, site benign. Post-procedure discharge instructions discussed and questions addressed 86 yo  male PMH tachybradycardia syndrome status post pacemaker placement (initial pacemaker implantation on January 4, 2011, Medtronic), pAFIB (on Xarelto 9/4/49), HTN, HLD presents today for presurgical testing for scheduled generator and lead change scheduled on 9/6/19. Patient seen and evaluated by Dr. Newsome, pacemaker with a failed RV lead, plan is to replace his Medtronic pacemaker and add an RV pacing lead. Pt is now s/p dual gen change with lead revision - Medtronic device settings DDD  to LACW.  He tolerated the procedure well.  Post procedure and AM EKG V-Paced @ 60 bpm.  Left ACW incision well approximated, site without presence of bleeding/hematoma, patient without complaints.  Provider teaching done, device paired by [employer] rep.  CXR confirming appropriate lead placement and without presence of pneumothorax.  Patient evaluated by EP attending.  He remains hemodynamically stable and his hospital course was otherwise uneventful.  Patient is now medically stable for discharge home today as per EP attending.

## 2019-09-06 NOTE — DISCHARGE NOTE PROVIDER - NSDCCPTREATMENT_GEN_ALL_CORE_FT
PRINCIPAL PROCEDURE  Procedure: Insertion of DDD cardiac pacemaker  Findings and Treatment: s/p pacemaker implantation

## 2019-09-06 NOTE — DISCHARGE NOTE PROVIDER - NSDCCPCAREPLAN_GEN_ALL_CORE_FT
PRINCIPAL DISCHARGE DIAGNOSIS  Diagnosis: CAD (coronary artery disease)  Assessment and Plan of Treatment: Your heart rate will be controlled.  Continue with follow-up visits to your electrophysiology team and with your home remote device monitoring (if applicable). Continue your medications as prescribed.      SECONDARY DISCHARGE DIAGNOSES  Diagnosis: HLD (hyperlipidemia)  Assessment and Plan of Treatment: Your LDL cholesterol will be less than 70mg/dL   Continue with your cholesterol medications. Eat a heart healthy diet that is low in saturated fats and salt, and includes whole grains, fruits, vegetables and lean protein; exercise regularly (consult with your physician or cardiologist first); maintain a heart healthy weight. Continue to follow with your primary physician or cardiologist for treatment goals, continue medication, have liver function testing every 3 months as anti lipid medications can cause liver irritation. If you smoke - quit (A resource to help you stop smoking is the [facility] [employer] for Tobacco Control – phone number [phone].).    Diagnosis: HTN (hypertension)  Assessment and Plan of Treatment: Your blood pressure will be controlled.   Continue with your blood pressure medications; eat a heart healthy diet with low salt diet; exercise regularly (consult with your physician or cardiologist first); maintain a heart healthy weight; if you smoke - quit (A resource to help you stop smoking is the [facility] [employer] for [employer] Control – phone number [phone].); include healthy ways to manage stress. Continue to follow with your primary care physician or cardiologist.

## 2019-09-06 NOTE — DISCHARGE NOTE PROVIDER - CARE PROVIDER_API CALL
Tonie Newsome (MD)  Cardiac Electrophysiology; Cardiovascular Disease; Internal Medicine  [address] [place] [address]  Phone: [phone]  Fax: [phone]  Follow Up Time:

## 2019-09-07 ENCOUNTER — TRANSCRIPTION ENCOUNTER (OUTPATIENT)
Age: 84
End: 2019-09-07

## 2019-09-07 VITALS
RESPIRATION RATE: 17 BRPM | DIASTOLIC BLOOD PRESSURE: 65 MMHG | HEART RATE: 61 BPM | OXYGEN SATURATION: 98 % | TEMPERATURE: 98 F | SYSTOLIC BLOOD PRESSURE: 113 MMHG

## 2019-09-07 LAB
ANION GAP SERPL CALC-SCNC: 11 MMOL/L — SIGNIFICANT CHANGE UP (ref 5–17)
BUN SERPL-MCNC: 15 MG/DL — SIGNIFICANT CHANGE UP (ref 7–23)
CALCIUM SERPL-MCNC: 9.3 MG/DL — SIGNIFICANT CHANGE UP (ref 8.4–10.5)
CHLORIDE SERPL-SCNC: 102 MMOL/L — SIGNIFICANT CHANGE UP (ref 96–108)
CO2 SERPL-SCNC: 29 MMOL/L — SIGNIFICANT CHANGE UP (ref 22–31)
CREAT SERPL-MCNC: 1.04 MG/DL — SIGNIFICANT CHANGE UP (ref 0.5–1.3)
GLUCOSE SERPL-MCNC: 95 MG/DL — SIGNIFICANT CHANGE UP (ref 70–99)
HCT VFR BLD CALC: 43.9 % — SIGNIFICANT CHANGE UP (ref 39–50)
HGB BLD-MCNC: 13.9 G/DL — SIGNIFICANT CHANGE UP (ref 13–17)
MCHC RBC-ENTMCNC: 30.3 PG — SIGNIFICANT CHANGE UP (ref 27–34)
MCHC RBC-ENTMCNC: 31.7 GM/DL — LOW (ref 32–36)
MCV RBC AUTO: 95.6 FL — SIGNIFICANT CHANGE UP (ref 80–100)
PLATELET # BLD AUTO: 206 K/UL — SIGNIFICANT CHANGE UP (ref 150–400)
POTASSIUM SERPL-MCNC: 4.7 MMOL/L — SIGNIFICANT CHANGE UP (ref 3.5–5.3)
POTASSIUM SERPL-SCNC: 4.7 MMOL/L — SIGNIFICANT CHANGE UP (ref 3.5–5.3)
RBC # BLD: 4.59 M/UL — SIGNIFICANT CHANGE UP (ref 4.2–5.8)
RBC # FLD: 12.7 % — SIGNIFICANT CHANGE UP (ref 10.3–14.5)
SODIUM SERPL-SCNC: 142 MMOL/L — SIGNIFICANT CHANGE UP (ref 135–145)
WBC # BLD: 8.1 K/UL — SIGNIFICANT CHANGE UP (ref 3.8–10.5)
WBC # FLD AUTO: 8.1 K/UL — SIGNIFICANT CHANGE UP (ref 3.8–10.5)

## 2019-09-07 PROCEDURE — 93010 ELECTROCARDIOGRAM REPORT: CPT

## 2019-09-07 PROCEDURE — 71046 X-RAY EXAM CHEST 2 VIEWS: CPT | Mod: 26

## 2019-09-07 RX ORDER — RIVAROXABAN 15 MG-20MG
1 KIT ORAL
Qty: 0 | Refills: 0 | DISCHARGE

## 2019-09-07 RX ADMIN — Medication 100 MILLIGRAM(S): at 05:19

## 2019-09-07 RX ADMIN — Medication 0.25 MILLIGRAM(S): at 05:20

## 2019-09-07 RX ADMIN — AMLODIPINE BESYLATE 5 MILLIGRAM(S): 2.5 TABLET ORAL at 05:20

## 2019-09-07 RX ADMIN — LISINOPRIL 20 MILLIGRAM(S): 2.5 TABLET ORAL at 05:19

## 2019-09-07 NOTE — PROGRESS NOTE ADULT - ASSESSMENT
Patient is a 85y old  Male who presents with a chief complaint of Failed RV lead (06 Sep 2019 19:25) now s/p Dual generator change with lead revision DDD  ( Medtronic) via LACW. Pt tolerated the procedure well, site benign. Post-procedure discharge instructions discussed and questions addressed

## 2019-09-07 NOTE — DISCHARGE NOTE NURSING/CASE MANAGEMENT/SOCIAL WORK - PATIENT PORTAL LINK FT
You can access the FollowMyHealth Patient Portal offered by [facility] by registering at the following website: [email or web address]. By joining [email or web address]’s FollowMyHealth portal, you will also be able to view your health information using other applications (apps) compatible with our system.

## 2019-09-07 NOTE — PROGRESS NOTE ADULT - SUBJECTIVE AND OBJECTIVE BOX
Patient is a 85y old  Male who presents with a chief complaint of Failed RV lead (06 Sep 2019 19:25) now s/p Dual generator change with lead revision DDD  ( [employer]tronic) via LACW           Allergies    No Known Allergies    Intolerances        Medications:  allopurinol 300 milliGRAM(s) Oral daily  amiodarone    Tablet 200 milliGRAM(s) Oral daily  amLODIPine   Tablet 5 milliGRAM(s) Oral daily  ceFAZolin   IVPB 1000 milliGRAM(s) IV Intermittent every 8 hours  digoxin     Tablet 0.25 milliGRAM(s) Oral daily  lisinopril 20 milliGRAM(s) Oral daily      Vitals:  T(C): 36.6 (09-06-19 @ 20:35), Max: 36.6 (09-06-19 @ 20:35)  HR: 60 (09-06-19 @ 20:35) (60 - 68)  BP: 126/65 (09-06-19 @ 20:35) (105/69 - 156/75)  BP(mean): --  RR: 16 (09-06-19 @ 20:35) (16 - 18)  SpO2: 97% (09-06-19 @ 20:35) (97% - 100%)  Wt(kg): --  Daily Height in cm: 180.34 (06 Sep 2019 08:30)    Daily   I&O's Summary        Physical Exam:  Appearance: Normal  Procedural Access Site: LACW. No hematoma, Non-tender to palpation, 2+ pulse, No bruit, No Ecchymosis  Musculoskeletal: No clubbing, No joint deformity   Neurologic: Non-focal  Psychiatry: AAOx3, Mood & affect appropriate  Skin: No rashes, No ecchymoses, No cyanosis    09-05    139  |  99  |  12  ----------------------------<  120<H>  4.3   |  27  |  1.07    Ca    9.9      05 Sep 2019 11:17    TPro  7.7  /  Alb  4.9  /  TBili  0.4  /  DBili  x   /  AST  29  /  ALT  17  /  AlkPhos  52  09-05    PT/INR - ( 05 Sep 2019 11:17 )   PT: 17.6 sec;   INR: 1.52 ratio         PTT - ( 05 Sep 2019 11:17 )  PTT:39.5 sec    Interpretation of Telemetry:

## 2019-09-18 ENCOUNTER — APPOINTMENT (OUTPATIENT)
Dept: ELECTROPHYSIOLOGY | Facility: CLINIC | Age: 84
End: 2019-09-18
Payer: MEDICARE

## 2019-09-18 ENCOUNTER — NON-APPOINTMENT (OUTPATIENT)
Age: 84
End: 2019-09-18

## 2019-09-18 VITALS
BODY MASS INDEX: 21 KG/M2 | HEART RATE: 71 BPM | WEIGHT: 150 LBS | SYSTOLIC BLOOD PRESSURE: 145 MMHG | DIASTOLIC BLOOD PRESSURE: 76 MMHG | OXYGEN SATURATION: 98 % | HEIGHT: 71 IN

## 2019-09-18 PROBLEM — A15.9 RESPIRATORY TUBERCULOSIS UNSPECIFIED: Chronic | Status: ACTIVE | Noted: 2019-09-05

## 2019-09-18 PROBLEM — C44.90 UNSPECIFIED MALIGNANT NEOPLASM OF SKIN, UNSPECIFIED: Chronic | Status: ACTIVE | Noted: 2019-09-05

## 2019-09-18 PROBLEM — R91.1 SOLITARY PULMONARY NODULE: Chronic | Status: ACTIVE | Noted: 2019-09-05

## 2019-09-18 PROBLEM — Z77.090 CONTACT WITH AND (SUSPECTED) EXPOSURE TO ASBESTOS: Chronic | Status: ACTIVE | Noted: 2019-09-05

## 2019-09-18 PROBLEM — I48.0 PAROXYSMAL ATRIAL FIBRILLATION: Chronic | Status: ACTIVE | Noted: 2019-09-05

## 2019-09-18 PROCEDURE — 99024 POSTOP FOLLOW-UP VISIT: CPT

## 2019-09-18 PROCEDURE — 93280 PM DEVICE PROGR EVAL DUAL: CPT

## 2019-10-01 PROCEDURE — 33214 UPGRADE OF PACEMAKER SYSTEM: CPT

## 2019-10-01 PROCEDURE — C1785: CPT

## 2019-10-01 PROCEDURE — 33207 INSERT HEART PM VENTRICULAR: CPT

## 2019-10-01 PROCEDURE — C1889: CPT

## 2019-10-01 PROCEDURE — 85027 COMPLETE CBC AUTOMATED: CPT

## 2019-10-01 PROCEDURE — 85730 THROMBOPLASTIN TIME PARTIAL: CPT

## 2019-10-01 PROCEDURE — 85610 PROTHROMBIN TIME: CPT

## 2019-10-01 PROCEDURE — 71046 X-RAY EXAM CHEST 2 VIEWS: CPT

## 2019-10-01 PROCEDURE — 71045 X-RAY EXAM CHEST 1 VIEW: CPT

## 2019-10-01 PROCEDURE — 93005 ELECTROCARDIOGRAM TRACING: CPT

## 2019-10-01 PROCEDURE — C1769: CPT

## 2019-10-01 PROCEDURE — C1898: CPT

## 2019-10-01 PROCEDURE — 80053 COMPREHEN METABOLIC PANEL: CPT

## 2019-10-01 PROCEDURE — G0463: CPT

## 2019-10-01 PROCEDURE — 86901 BLOOD TYPING SEROLOGIC RH(D): CPT

## 2019-10-01 PROCEDURE — 80048 BASIC METABOLIC PNL TOTAL CA: CPT

## 2019-10-01 PROCEDURE — 86900 BLOOD TYPING SEROLOGIC ABO: CPT

## 2019-10-01 PROCEDURE — 33233 REMOVAL OF PM GENERATOR: CPT

## 2019-10-01 PROCEDURE — 86850 RBC ANTIBODY SCREEN: CPT

## 2019-10-18 ENCOUNTER — APPOINTMENT (OUTPATIENT)
Dept: CARDIOLOGY | Facility: CLINIC | Age: 84
End: 2019-10-18
Payer: MEDICARE

## 2019-10-18 VITALS — DIASTOLIC BLOOD PRESSURE: 67 MMHG | HEART RATE: 93 BPM | SYSTOLIC BLOOD PRESSURE: 143 MMHG | OXYGEN SATURATION: 99 %

## 2019-10-18 DIAGNOSIS — M79.673 PAIN IN UNSPECIFIED FOOT: ICD-10-CM

## 2019-10-18 DIAGNOSIS — Z86.39 PERSONAL HISTORY OF OTHER ENDOCRINE, NUTRITIONAL AND METABOLIC DISEASE: ICD-10-CM

## 2019-10-18 DIAGNOSIS — I48.91 UNSPECIFIED ATRIAL FIBRILLATION: ICD-10-CM

## 2019-10-18 DIAGNOSIS — Z86.79 PERSONAL HISTORY OF OTHER DISEASES OF THE CIRCULATORY SYSTEM: ICD-10-CM

## 2019-10-18 PROCEDURE — 93000 ELECTROCARDIOGRAM COMPLETE: CPT

## 2019-10-18 PROCEDURE — 99213 OFFICE O/P EST LOW 20 MIN: CPT

## 2019-10-24 ENCOUNTER — RX RENEWAL (OUTPATIENT)
Age: 84
End: 2019-10-24

## 2019-10-30 ENCOUNTER — RX RENEWAL (OUTPATIENT)
Age: 84
End: 2019-10-30

## 2019-11-05 NOTE — HISTORY OF PRESENT ILLNESS
[FreeTextEntry1] : 86 yo man with pAF on AC, SA node dysfunction s/p PPM, uncontrolled HTN on meds presents with complaints of bilateral foot pain and sensation of fullness for the past few weeks.  He wanted to make sure its not related to his pacemaker.  \par \par  \par

## 2019-11-05 NOTE — REASON FOR VISIT
[Acute Exacerbation] : an acute exacerbation of [Atrial Fibrillation] : atrial fibrillation [FreeTextEntry1] : foot pain  [Spouse] : spouse

## 2019-11-05 NOTE — PHYSICAL EXAM
[General Appearance - Well Developed] : well developed [Normal Appearance] : normal appearance [Well Groomed] : well groomed [General Appearance - Well Nourished] : well nourished [No Deformities] : no deformities [General Appearance - In No Acute Distress] : no acute distress [Normal Conjunctiva] : the conjunctiva exhibited no abnormalities [Eyelids - No Xanthelasma] : the eyelids demonstrated no xanthelasmas [Normal Oral Mucosa] : normal oral mucosa [No Oral Pallor] : no oral pallor [No Oral Cyanosis] : no oral cyanosis [Normal Jugular Venous A Waves Present] : normal jugular venous A waves present [Normal Jugular Venous V Waves Present] : normal jugular venous V waves present [No Jugular Venous Langston A Waves] : no jugular venous langston A waves [Respiration, Rhythm And Depth] : normal respiratory rhythm and effort [Exaggerated Use Of Accessory Muscles For Inspiration] : no accessory muscle use [Auscultation Breath Sounds / Voice Sounds] : lungs were clear to auscultation bilaterally [Heart Rate And Rhythm] : heart rate and rhythm were normal [Heart Sounds] : normal S1 and S2 [Murmurs] : no murmurs present [Edema] : no peripheral edema present [2+] : left 2+ [Abdomen Soft] : soft [Abdomen Tenderness] : non-tender [Abdomen Mass (___ Cm)] : no abdominal mass palpated [Abnormal Walk] : normal gait [Gait - Sufficient For Exercise Testing] : the gait was sufficient for exercise testing [Nail Clubbing] : no clubbing of the fingernails [Cyanosis, Localized] : no localized cyanosis [Petechial Hemorrhages (___cm)] : no petechial hemorrhages [Skin Color & Pigmentation] : normal skin color and pigmentation [] : no rash [No Venous Stasis] : no venous stasis [Skin Lesions] : no skin lesions [No Skin Ulcers] : no skin ulcer [No Xanthoma] : no  xanthoma was observed [Oriented To Time, Place, And Person] : oriented to person, place, and time [Affect] : the affect was normal [Mood] : the mood was normal [No Anxiety] : not feeling anxious

## 2019-11-05 NOTE — DISCUSSION/SUMMARY
[FreeTextEntry1] : Mr. Armstrong is an 84 y/o with HTN, Hchol, PAF, PPM  here for an acute visit with bilateral foot discomfort likely related to plantar fasciitis. Very low suspicion for vascular issues given normal physical exam.  I suggested he sees a podiatrist.  Referral provided.  \par \par

## 2019-12-02 ENCOUNTER — RX RENEWAL (OUTPATIENT)
Age: 84
End: 2019-12-02

## 2019-12-11 ENCOUNTER — FORM ENCOUNTER (OUTPATIENT)
Age: 84
End: 2019-12-11

## 2019-12-12 ENCOUNTER — APPOINTMENT (OUTPATIENT)
Dept: CT IMAGING | Facility: CLINIC | Age: 84
End: 2019-12-12
Payer: MEDICARE

## 2019-12-12 ENCOUNTER — OUTPATIENT (OUTPATIENT)
Dept: OUTPATIENT SERVICES | Facility: HOSPITAL | Age: 84
LOS: 1 days | End: 2019-12-12
Payer: MEDICARE

## 2019-12-12 DIAGNOSIS — Z77.090 CONTACT WITH AND (SUSPECTED) EXPOSURE TO ASBESTOS: ICD-10-CM

## 2019-12-12 DIAGNOSIS — Z95.0 PRESENCE OF CARDIAC PACEMAKER: Chronic | ICD-10-CM

## 2019-12-12 PROCEDURE — 71250 CT THORAX DX C-: CPT

## 2019-12-12 PROCEDURE — 71250 CT THORAX DX C-: CPT | Mod: 26

## 2019-12-19 ENCOUNTER — APPOINTMENT (OUTPATIENT)
Dept: PULMONOLOGY | Facility: CLINIC | Age: 84
End: 2019-12-19
Payer: MEDICARE

## 2019-12-19 VITALS
OXYGEN SATURATION: 98 % | DIASTOLIC BLOOD PRESSURE: 66 MMHG | WEIGHT: 145 LBS | SYSTOLIC BLOOD PRESSURE: 136 MMHG | HEIGHT: 69 IN | BODY MASS INDEX: 21.48 KG/M2 | HEART RATE: 87 BPM

## 2019-12-19 DIAGNOSIS — R93.89 ABNORMAL FINDINGS ON DIAGNOSTIC IMAGING OF OTHER SPECIFIED BODY STRUCTURES: ICD-10-CM

## 2019-12-19 DIAGNOSIS — R91.1 SOLITARY PULMONARY NODULE: ICD-10-CM

## 2019-12-19 DIAGNOSIS — Z77.090 CONTACT WITH AND (SUSPECTED) EXPOSURE TO ASBESTOS: ICD-10-CM

## 2019-12-19 DIAGNOSIS — J92.9 PLEURAL PLAQUE W/OUT ASBESTOS: ICD-10-CM

## 2019-12-19 PROCEDURE — 99214 OFFICE O/P EST MOD 30 MIN: CPT

## 2019-12-20 ENCOUNTER — APPOINTMENT (OUTPATIENT)
Dept: ELECTROPHYSIOLOGY | Facility: CLINIC | Age: 84
End: 2019-12-20
Payer: MEDICARE

## 2019-12-20 VITALS
OXYGEN SATURATION: 99 % | BODY MASS INDEX: 21.48 KG/M2 | WEIGHT: 145 LBS | HEART RATE: 92 BPM | HEIGHT: 69 IN | DIASTOLIC BLOOD PRESSURE: 71 MMHG | SYSTOLIC BLOOD PRESSURE: 126 MMHG

## 2019-12-20 PROCEDURE — 93280 PM DEVICE PROGR EVAL DUAL: CPT

## 2020-01-23 ENCOUNTER — APPOINTMENT (OUTPATIENT)
Dept: CARDIOLOGY | Facility: CLINIC | Age: 85
End: 2020-01-23

## 2020-03-20 ENCOUNTER — APPOINTMENT (OUTPATIENT)
Dept: ELECTROPHYSIOLOGY | Facility: CLINIC | Age: 85
End: 2020-03-20
Payer: MEDICARE

## 2020-03-20 PROCEDURE — 93294 REM INTERROG EVL PM/LDLS PM: CPT

## 2020-03-20 PROCEDURE — 93296 REM INTERROG EVL PM/IDS: CPT

## 2020-04-06 ENCOUNTER — APPOINTMENT (OUTPATIENT)
Dept: PULMONOLOGY | Facility: CLINIC | Age: 85
End: 2020-04-06

## 2020-04-27 ENCOUNTER — RX RENEWAL (OUTPATIENT)
Age: 85
End: 2020-04-27

## 2020-05-21 ENCOUNTER — EMERGENCY (EMERGENCY)
Facility: HOSPITAL | Age: 85
LOS: 1 days | Discharge: HOME | End: 2020-05-21
Attending: STUDENT IN AN ORGANIZED HEALTH CARE EDUCATION/TRAINING PROGRAM
Payer: MEDICARE

## 2020-05-21 VITALS
HEIGHT: 70 IN | OXYGEN SATURATION: 99 % | HEART RATE: 85 BPM | SYSTOLIC BLOOD PRESSURE: 165 MMHG | WEIGHT: 154.98 LBS | RESPIRATION RATE: 16 BRPM | DIASTOLIC BLOOD PRESSURE: 82 MMHG | TEMPERATURE: 98 F

## 2020-05-21 DIAGNOSIS — Z95.0 PRESENCE OF CARDIAC PACEMAKER: Chronic | ICD-10-CM

## 2020-05-21 LAB
ALBUMIN SERPL ELPH-MCNC: 4.4 G/DL — SIGNIFICANT CHANGE UP (ref 3.3–5)
ALP SERPL-CCNC: 58 U/L — SIGNIFICANT CHANGE UP (ref 40–120)
ALT FLD-CCNC: 24 U/L — SIGNIFICANT CHANGE UP (ref 10–45)
ANION GAP SERPL CALC-SCNC: 14 MMOL/L — SIGNIFICANT CHANGE UP (ref 5–17)
AST SERPL-CCNC: 65 U/L — HIGH (ref 10–40)
BILIRUB SERPL-MCNC: 0.6 MG/DL — SIGNIFICANT CHANGE UP (ref 0.2–1.2)
BUN SERPL-MCNC: 13 MG/DL — SIGNIFICANT CHANGE UP (ref 7–23)
CALCIUM SERPL-MCNC: 9.1 MG/DL — SIGNIFICANT CHANGE UP (ref 8.4–10.5)
CHLORIDE SERPL-SCNC: 100 MMOL/L — SIGNIFICANT CHANGE UP (ref 96–108)
CO2 SERPL-SCNC: 24 MMOL/L — SIGNIFICANT CHANGE UP (ref 22–31)
CREAT SERPL-MCNC: 0.95 MG/DL — SIGNIFICANT CHANGE UP (ref 0.5–1.3)
GLUCOSE SERPL-MCNC: 116 MG/DL — HIGH (ref 70–99)
HCT VFR BLD CALC: 37.6 % — LOW (ref 39–50)
HGB BLD-MCNC: 12.3 G/DL — LOW (ref 13–17)
MAGNESIUM SERPL-MCNC: 1.7 MG/DL — SIGNIFICANT CHANGE UP (ref 1.6–2.6)
MCHC RBC-ENTMCNC: 30 PG — SIGNIFICANT CHANGE UP (ref 27–34)
MCHC RBC-ENTMCNC: 32.7 GM/DL — SIGNIFICANT CHANGE UP (ref 32–36)
MCV RBC AUTO: 91.7 FL — SIGNIFICANT CHANGE UP (ref 80–100)
NRBC # BLD: 0 /100 WBCS — SIGNIFICANT CHANGE UP (ref 0–0)
NT-PROBNP SERPL-SCNC: 269 PG/ML — SIGNIFICANT CHANGE UP (ref 0–300)
PLATELET # BLD AUTO: 171 K/UL — SIGNIFICANT CHANGE UP (ref 150–400)
POTASSIUM SERPL-MCNC: 5 MMOL/L — SIGNIFICANT CHANGE UP (ref 3.5–5.3)
POTASSIUM SERPL-SCNC: 5 MMOL/L — SIGNIFICANT CHANGE UP (ref 3.5–5.3)
PROT SERPL-MCNC: 7.5 G/DL — SIGNIFICANT CHANGE UP (ref 6–8.3)
RBC # BLD: 4.1 M/UL — LOW (ref 4.2–5.8)
RBC # FLD: 14.1 % — SIGNIFICANT CHANGE UP (ref 10.3–14.5)
SARS-COV-2 RNA SPEC QL NAA+PROBE: SIGNIFICANT CHANGE UP
SODIUM SERPL-SCNC: 138 MMOL/L — SIGNIFICANT CHANGE UP (ref 135–145)
TROPONIN T, HIGH SENSITIVITY RESULT: 10 NG/L — SIGNIFICANT CHANGE UP (ref 0–51)
TROPONIN T, HIGH SENSITIVITY RESULT: 10 NG/L — SIGNIFICANT CHANGE UP (ref 0–51)
WBC # BLD: 9.92 K/UL — SIGNIFICANT CHANGE UP (ref 3.8–10.5)
WBC # FLD AUTO: 9.92 K/UL — SIGNIFICANT CHANGE UP (ref 3.8–10.5)

## 2020-05-21 PROCEDURE — 99218: CPT | Mod: GC

## 2020-05-21 PROCEDURE — 93010 ELECTROCARDIOGRAM REPORT: CPT | Mod: GC

## 2020-05-21 PROCEDURE — 71046 X-RAY EXAM CHEST 2 VIEWS: CPT | Mod: 26

## 2020-05-21 RX ORDER — DIGOXIN 250 MCG
0.25 TABLET ORAL DAILY
Refills: 0 | Status: DISCONTINUED | OUTPATIENT
Start: 2020-05-21 | End: 2020-05-25

## 2020-05-21 RX ORDER — RIVAROXABAN 15 MG-20MG
20 KIT ORAL
Refills: 0 | Status: DISCONTINUED | OUTPATIENT
Start: 2020-05-21 | End: 2020-05-25

## 2020-05-21 RX ORDER — ASPIRIN/CALCIUM CARB/MAGNESIUM 324 MG
162 TABLET ORAL ONCE
Refills: 0 | Status: DISCONTINUED | OUTPATIENT
Start: 2020-05-21 | End: 2020-05-21

## 2020-05-21 RX ORDER — LISINOPRIL 2.5 MG/1
20 TABLET ORAL DAILY
Refills: 0 | Status: DISCONTINUED | OUTPATIENT
Start: 2020-05-21 | End: 2020-05-25

## 2020-05-21 RX ORDER — AMLODIPINE BESYLATE 2.5 MG/1
5 TABLET ORAL DAILY
Refills: 0 | Status: DISCONTINUED | OUTPATIENT
Start: 2020-05-21 | End: 2020-05-25

## 2020-05-21 RX ORDER — ALLOPURINOL 300 MG
300 TABLET ORAL DAILY
Refills: 0 | Status: DISCONTINUED | OUTPATIENT
Start: 2020-05-21 | End: 2020-05-25

## 2020-05-21 RX ORDER — SODIUM CHLORIDE 9 MG/ML
3 INJECTION INTRAMUSCULAR; INTRAVENOUS; SUBCUTANEOUS EVERY 8 HOURS
Refills: 0 | Status: DISCONTINUED | OUTPATIENT
Start: 2020-05-21 | End: 2020-05-25

## 2020-05-21 RX ORDER — AMIODARONE HYDROCHLORIDE 400 MG/1
200 TABLET ORAL DAILY
Refills: 0 | Status: DISCONTINUED | OUTPATIENT
Start: 2020-05-21 | End: 2020-05-25

## 2020-05-21 RX ADMIN — RIVAROXABAN 20 MILLIGRAM(S): KIT at 19:23

## 2020-05-21 RX ADMIN — AMIODARONE HYDROCHLORIDE 200 MILLIGRAM(S): 400 TABLET ORAL at 20:34

## 2020-05-21 RX ADMIN — Medication 0.25 MILLIGRAM(S): at 20:34

## 2020-05-21 RX ADMIN — SODIUM CHLORIDE 3 MILLILITER(S): 9 INJECTION INTRAMUSCULAR; INTRAVENOUS; SUBCUTANEOUS at 20:03

## 2020-05-21 NOTE — ED PROVIDER NOTE - OBJECTIVE STATEMENT
85M pmhx tachybradycardia syndrome s/p PPM, pAFIB (on Xarelto 9/4/49), HTN, HLD, s/p cholecystectomy presents with episode of epigastric pressure/pain after working in the garden today, non-radiating, associated with some sweating, no SOB. Is resolved in the ED. Never happened before. Denies chest pain, back pain, N/V/D. Drinks 2 glasses wine nightly. Denies exertional chest pain or SOB.

## 2020-05-21 NOTE — ED ADULT NURSE NOTE - NSIMPLEMENTINTERV_GEN_ALL_ED
Implemented All Fall with Harm Risk Interventions:  [place] to call system. Call bell, personal items and telephone within reach. Instruct patient to call for assistance. Room bathroom lighting operational. Non-slip footwear when patient is off stretcher. Physically safe environment: no spills, clutter or unnecessary equipment. Stretcher in lowest position, wheels locked, appropriate side rails in place. Provide visual cue, wrist band, yellow gown, etc. Monitor gait and stability. Monitor for mental status changes and reorient to person, place, and time. Review medications for side effects contributing to fall risk. Reinforce activity limits and safety measures with patient and family. Provide visual clues: red socks.

## 2020-05-21 NOTE — ED ADULT NURSE NOTE - CHPI ED NUR SYMPTOMS NEG
no diarrhea/no abdominal distension/no vomiting/no blood in stool/no chills/no fever/no dysuria/no burning urination/no hematuria/no nausea

## 2020-05-21 NOTE — ED CDU PROVIDER INITIAL DAY NOTE - ATTENDING CONTRIBUTION TO CARE
85M pmhx tachybradycardia syndrome s/p PPM, pAFIB (on Xarelto 9/4/49), HTN, HLD, s/p cholecystectomy presents with episode of epigastric pressure/pain after working in the garden today, non-radiating, associated with some sweating, no SOB. Pain had resolved prior to arriving at the ER.  Pt was well appearing with normal vitals, heart rrr, lungs cta.  EKG showed paced rhthm.  Trop 10 in ED.  Will send to CDU for tele monitoring and stress testing.

## 2020-05-21 NOTE — ED ADULT NURSE REASSESSMENT NOTE - NS ED NURSE REASSESS COMMENT FT1
Report given to CDU by freeman RN. Patient is medicated as per order. Pt updated to plan of care for transport to CDU.
Pt received from CHANDRA Darden. Pt oriented to CDU & plan of care was discussed. Pt A&O x 4. Pt in CDU for vital signs q4h, monitor on tele, trend cardiac enzymes and EKG, Stress test, frequent re-evaluations. Pt denies any chest pain, SOB, dizziness, N/V, or palpitations as of now. Pt on a cardiac monitor in paced rhythm, HR in 70's. Pt aware that he is having a stress in the am and not to have any caffeine. Pt resting in bed. Safety & comfort measures maintained. Call bell in reach. Will continue to monitor.

## 2020-05-21 NOTE — ED CDU PROVIDER INITIAL DAY NOTE - MEDICAL DECISION MAKING DETAILS
Pt with episode of chest pain and diaphoresis concerning for ACS - will observe in CDU for tele monitoring, frequent evals, and stress testing.

## 2020-05-21 NOTE — ED ADULT NURSE NOTE - OBJECTIVE STATEMENT
Received pt. with complaints of  epigastric pain s/p working in the garden today. Pt. reports some sweating, Pt. denies any  SOB chest pain N/V/D or chest pain at this time.  Will continue to monitor as ordered.

## 2020-05-21 NOTE — ED CDU PROVIDER INITIAL DAY NOTE - OBJECTIVE STATEMENT
85M pmhx tachybradycardia syndrome s/p PPM, pAFIB (on Xarelto 9/4/49), HTN, HLD, s/p cholecystectomy presents with episode of epigastric pressure/pain after working in the garden today, non-radiating, associated with some sweating, no SOB. Is resolved in the ED. Never happened before. Denies chest pain, back pain, N/V/D. Drinks 2 glasses wine nightly. Denies exertional chest pain or SOB.    In the ED VSS.  Trop 10.  EKG unchanged.  Patient currently asymptomatic.  Case discussed with patient's cardiologist, Dr. Gaspar who is recommending tele monitoring in the CDU overnight and Stress test tomorrow.

## 2020-05-21 NOTE — ED PROVIDER NOTE - NS ED ROS FT
CONST: no fevers, no chills  EYES: no pain, no vision changes  ENT: no sore throat, no ear pain, no change in hearing  CV: no chest pain, no leg swelling  RESP: no shortness of breath, no cough  ABD: +epigastric abdominal pain, no nausea, no vomiting, no diarrhea  : no dysuria, no flank pain, no hematuria  MSK: no back pain, no extremity pain  NEURO: no headache or additional neurologic complaints  HEME: no easy bleeding  SKIN:  no rash

## 2020-05-21 NOTE — ED PROVIDER NOTE - PROGRESS NOTE DETAILS
Rosy Andrade, Resident: spoke with Dr. Gaspar over the phone, agrees with plan to send to CDU for possible stress tomorrow. Will see patient tues/wed if no immediate concerns.

## 2020-05-21 NOTE — ED CDU PROVIDER INITIAL DAY NOTE - PROGRESS NOTE DETAILS
Pt just arrived in CDU as was awaiting COVID result, COVID negative. Patient seen at bedside in NAD.  VSS.  Patient resting comfortably without complaints. Paced rhythm on tele. Denies chest pain, epigastric pain, n/v/d, shortness of breath. Aware of plan for stress test in AM. Will continue to monitor. - Ravi Lewis PA-C

## 2020-05-21 NOTE — ED PROVIDER NOTE - PHYSICAL EXAMINATION
Rosy Andrade DO.:   GENERAL: Patient awake alert NAD.  HEENT: NC/AT, Moist mucous membranes, PERRL, EOMI.  LUNGS: CTAB, no wheezes or crackles.   CARDIAC: RRR, no m/r/g.    ABDOMEN: Soft, NT, ND, No rebound, guarding. No CVA tenderness.   EXT: No edema. No calf tenderness.  MSK: No spinal tenderness, no pain with movement, no deformities.  NEURO: A&Ox3. Moving all extremities.  SKIN: Warm and dry. No rash.  PSYCH: Normal affect.

## 2020-05-21 NOTE — ED PROVIDER NOTE - CLINICAL SUMMARY MEDICAL DECISION MAKING FREE TEXT BOX
85M p/w epigastric pressure, s/p liudmila, concern for angina, stable. Currently pain free. Will get labs, EKG, trop, CXR, lipase. Sees Dr. Gaspar will contact cards for likely close outpt follow up.

## 2020-05-21 NOTE — ED PROVIDER NOTE - ATTENDING CONTRIBUTION TO CARE
I performed a history and physical exam of the patient and discussed their management with the resident.  I reviewed the resident's note and agree with the documented findings and plan of care except as noted below. My medical decision making and observations are as follows:    85M pmh tachybradycardia syndrome s/p PPM, pAFIB (on Xarelto 9/4/49), HTN, HLD, s/p cholecystectomy presents with episode of epigastric pressure/pain after working in the garden today, non-radiating, associated with some sweating, no SOB. Is resolved in the ED.  Pt currently in NAD, apepars comfortable, heart rrr, lungs cta, abd soft ntnd.  Story concerning for ACS give pain/pressure on exertion associated with diaphoresis.  will check labs, trop, cxr, ekg and dispo pending results - if patient has normal trops and we can facilitate rapid out patient follow up with Dr. Gaspar (cards), would consider discharge.

## 2020-05-22 VITALS
SYSTOLIC BLOOD PRESSURE: 126 MMHG | TEMPERATURE: 98 F | OXYGEN SATURATION: 98 % | DIASTOLIC BLOOD PRESSURE: 68 MMHG | RESPIRATION RATE: 19 BRPM | HEART RATE: 95 BPM

## 2020-05-22 LAB
A1C WITH ESTIMATED AVERAGE GLUCOSE RESULT: 5.5 % — SIGNIFICANT CHANGE UP (ref 4–5.6)
CHOLEST SERPL-MCNC: 227 MG/DL — HIGH (ref 10–199)
ESTIMATED AVERAGE GLUCOSE: 111 MG/DL — SIGNIFICANT CHANGE UP (ref 68–114)
HDLC SERPL-MCNC: 84 MG/DL — SIGNIFICANT CHANGE UP
LIPID PNL WITH DIRECT LDL SERPL: 115 MG/DL — HIGH
TOTAL CHOLESTEROL/HDL RATIO MEASUREMENT: 2.7 RATIO — LOW (ref 3.4–9.6)
TRIGL SERPL-MCNC: 138 MG/DL — SIGNIFICANT CHANGE UP (ref 10–149)

## 2020-05-22 PROCEDURE — 99284 EMERGENCY DEPT VISIT MOD MDM: CPT | Mod: 25

## 2020-05-22 PROCEDURE — 83735 ASSAY OF MAGNESIUM: CPT

## 2020-05-22 PROCEDURE — 83690 ASSAY OF LIPASE: CPT

## 2020-05-22 PROCEDURE — A9500: CPT

## 2020-05-22 PROCEDURE — 93017 CV STRESS TEST TRACING ONLY: CPT

## 2020-05-22 PROCEDURE — 78452 HT MUSCLE IMAGE SPECT MULT: CPT

## 2020-05-22 PROCEDURE — 93005 ELECTROCARDIOGRAM TRACING: CPT

## 2020-05-22 PROCEDURE — 99217: CPT

## 2020-05-22 PROCEDURE — 84484 ASSAY OF TROPONIN QUANT: CPT

## 2020-05-22 PROCEDURE — 83036 HEMOGLOBIN GLYCOSYLATED A1C: CPT

## 2020-05-22 PROCEDURE — G0378: CPT

## 2020-05-22 PROCEDURE — 71046 X-RAY EXAM CHEST 2 VIEWS: CPT

## 2020-05-22 PROCEDURE — 85027 COMPLETE CBC AUTOMATED: CPT

## 2020-05-22 PROCEDURE — 78452 HT MUSCLE IMAGE SPECT MULT: CPT | Mod: 26

## 2020-05-22 PROCEDURE — 80061 LIPID PANEL: CPT

## 2020-05-22 PROCEDURE — 93018 CV STRESS TEST I&R ONLY: CPT

## 2020-05-22 PROCEDURE — 80053 COMPREHEN METABOLIC PANEL: CPT

## 2020-05-22 PROCEDURE — 83880 ASSAY OF NATRIURETIC PEPTIDE: CPT

## 2020-05-22 PROCEDURE — 93016 CV STRESS TEST SUPVJ ONLY: CPT

## 2020-05-22 RX ADMIN — AMIODARONE HYDROCHLORIDE 200 MILLIGRAM(S): 400 TABLET ORAL at 16:09

## 2020-05-22 RX ADMIN — Medication 300 MILLIGRAM(S): at 14:36

## 2020-05-22 RX ADMIN — SODIUM CHLORIDE 3 MILLILITER(S): 9 INJECTION INTRAMUSCULAR; INTRAVENOUS; SUBCUTANEOUS at 07:05

## 2020-05-22 RX ADMIN — AMLODIPINE BESYLATE 5 MILLIGRAM(S): 2.5 TABLET ORAL at 07:47

## 2020-05-22 RX ADMIN — Medication 0.25 MILLIGRAM(S): at 16:09

## 2020-05-22 RX ADMIN — LISINOPRIL 20 MILLIGRAM(S): 2.5 TABLET ORAL at 07:47

## 2020-05-22 RX ADMIN — SODIUM CHLORIDE 3 MILLILITER(S): 9 INJECTION INTRAMUSCULAR; INTRAVENOUS; SUBCUTANEOUS at 14:28

## 2020-05-22 NOTE — ED CDU PROVIDER DISPOSITION NOTE - ATTENDING CONTRIBUTION TO CARE
Dr Griffith Note: Dr Griffith Note: 86 yo male pmhx tachycardia syndrome s/p PPM, afib on Xarelto, HTN, HLD, seen in CDU for episode of epigastric pain that occurred yesterday after gardening, self resolved had associated diaphoresis  Sen in ED, workup +small pleural effusion   In conjunction with pts cardiologist plan for CDU and stress today   Pt without any complaints in CDU  Pt denies any cp, sob, fevers, chills, cough,     Gen: no acute distress non toxic alert and coherent, no cyanosis   HEENT: atraumatic,  no scleral icterus  EOMI   Neck: no midline tenderness, supple  Lungs: Air entry good, clear to auscultation and percussion   CVS: reg HR S1/S2 no murmur no gallop   ABD: +BS in all 4 quadrants, soft, non tender,  non distended, non palpable liver and spleen and no other masses. no hernias.  Back: no midline tenderness   Extremities: No deformities, no edema, no calf tenderness  Neuro: AA and Ox3, CNII-XII grossly intact    ACS workup   awaiting stress, dispo post stress test and re evaluation  currently stable in CDU

## 2020-05-22 NOTE — ED ADULT NURSE REASSESSMENT NOTE - ANCILLARY STATUS
vital signs q4h, monitor on tele, trend cardiac enzymes and EKG, Stress test, frequent re-evaluations
cardiovascular  test  is  pending
cardiovascular tests pending/Patient  is  going  to  stress test.

## 2020-05-22 NOTE — ED CDU PROVIDER SUBSEQUENT DAY NOTE - HISTORY
84 yo male pmhx tachycardia syndrome s/p PPM, afib on xarelto, HTN, HLD currently being observed in CDU for episode of epigastric pain, awaiting stress test in the AM per request of his cardiologist. Patient seen at bedside in NAD.  VSS.  Patient resting comfortably without complaints. Paced rhythm on tele to 80s. Denies chest pain, abdominal pain, n/v/d, back pain, shortness of breath. Appears well, ambulating to bathroom w/o symptoms or difficulty. Will continue to monitor. - Ravi Lewis PA-C

## 2020-05-22 NOTE — ED ADULT NURSE REASSESSMENT NOTE - COMFORT CARE
plan of care explained/darkened lights
darkened lights/plan of care explained/side rails up/treatment delay explained/wait time explained
assisted to bathroom/meal provided/treatment delay explained/wait time explained/warm blanket provided/plan of care explained/side rails up
meal provided/treatment delay explained/darkened lights/side rails up/plan of care explained/warm blanket provided

## 2020-05-22 NOTE — ED CDU PROVIDER SUBSEQUENT DAY NOTE - PROGRESS NOTE DETAILS
Patient seen at bedside in NAD.  VSS.  Patient resting comfortably without complaints. No events on tele. No interval changes from previous CDU note. Denies cp, sob, dizziness, n/v/d, abd pain. Will continue to monitor. - Ravi Lewis PA-C FABIOLA Benedict: Patient seen at bedside in NAD.  VSS.  Patient resting comfortably without complaints. no events over tele. pending pharm nuc stress FABIOLA Benedict: patient going to stress test FABIOLA Benedict: patient still at stress will r-eeval upon return FABIOLA Benedict: Patient seen at bedside in NAD.  VSS.  Patient resting comfortably without complaints. no events noted over tele. reviewed case and stress results with covering cardiologist Dr. Dinh Causey (for cardiologist Dr. Aaliyah Gaspar) who cleared pt for discharge home and to be seen in the office next week. ED attending Dr. Griffith aware of above and agreed with discharge home

## 2020-05-22 NOTE — ED CDU PROVIDER DISPOSITION NOTE - NSFOLLOWUPINSTRUCTIONS_ED_ALL_ED_FT
1. Follow up with your PMD in 1-2 days. Additionally please follow up with either your cardiologist Dr. Gaspar within the next 2-3 days for further evaluation/management regarding your symptoms.   2. Show copies of your reports given to you. Take all of your other medications as previously prescribed.   3. If you develop any worsening or continued chest pain, shortness of breath, dizziness, weakness, abdominal pain, nausea/vomiting or any other concerning symptoms please return to the ED immediately.

## 2020-05-22 NOTE — ED CDU PROVIDER DISPOSITION NOTE - PATIENT PORTAL LINK FT
You can access the FollowMyHealth Patient Portal offered by [facility] by registering at the following website: [email or web address]. By joining [employer]’s FollowMyHealth portal, you will also be able to view your health information using other applications (apps) compatible with our system.

## 2020-05-22 NOTE — ED CDU PROVIDER DISPOSITION NOTE - CLINICAL COURSE
84 yo male PMHx tachybradycardia syndrome s/p PPM, pAFIB (on Xarelto 9/4/49), HTN, HLD, s/p cholecystectomy presented to the ED with an episode of epigastric pressure/pain after working in the garden. Pain was non-radiating, associated with some sweating. Never had episode like this before. Denies chest pain, back pain, shortness of breath, n/v/d, cough, fever/chills, exertional/pleuritic component.   ED Course: EKG unchanged, VSS, labs including CBC/CMP not acutely actionable. Trop 10, unchanged on repeat. CXR showed small R pleural effusion Patient currently asymptomatic. Case discussed with patient's cardiologist, Dr. Gaspar who is recommending tele monitoring in the CDU overnight and Stress test tomorrow. Pt did well in CDU overnight with no return of presenting symptoms. No events on tele overnight. Stress tested resulted as ______________________. 86 yo male PMHx tachybradycardia syndrome s/p PPM, pAFIB (on Xarelto 9/4/49), HTN, HLD, s/p cholecystectomy presented to the ED with an episode of epigastric pressure/pain after working in the garden. Pain was non-radiating, associated with some sweating. Never had episode like this before. Denies chest pain, back pain, shortness of breath, n/v/d, cough, fever/chills, exertional/pleuritic component.   ED Course: EKG unchanged, VSS, labs including CBC/CMP not acutely actionable. Trop 10, unchanged on repeat. CXR showed small R pleural effusion Patient currently asymptomatic. Case discussed with patient's cardiologist, Dr. Gaspar who is recommending tele monitoring in the CDU overnight and Stress test tomorrow. Pt did well in CDU overnight with no return of presenting symptoms. No events on tele overnight. Stress tested resulted as large sized moderate fixed defect in the mid to basal septum suggestive of infarct. reviewed case and stress results with covering cardiologist Dr. Dinh Causey (for cardiologist Dr. Aaliyah Gaspar) who cleared pt for discharge home and to be seen in the office next week. ED attending Dr. Griffith aware of above and agreed with discharge home

## 2020-05-22 NOTE — ED ADULT NURSE REASSESSMENT NOTE - GENERAL PATIENT STATE
cooperative/comfortable appearance
no change observed/comfortable appearance/cooperative/improvement verbalized
comfortable appearance/cooperative/no change observed
improvement verbalized/no change observed/resting/sleeping

## 2020-05-22 NOTE — ED CDU PROVIDER SUBSEQUENT DAY NOTE - ATTENDING CONTRIBUTION TO CARE
Dr Griffith Note: 84 yo male pmhx tachycardia syndrome s/p PPM, afib on Xarelto, HTN, HLD, seen in CDU for episode of epigastric pain that occurred yesterday after gardening, self resolved had associated diaphoresis  Sen in ED, workup +small pleural effusion   In conjunction with pts cardiologist plan for CDU and stress today   Pt without any complaints in CDU  Pt denies any cp, sob, fevers, chills, cough,     Gen: no acute distress non toxic alert and coherent, no cyanosis   HEENT: atraumatic,  no scleral icterus  EOMI   Neck: no midline tenderness, supple  Lungs: Air entry good, clear to auscultation and percussion   CVS: reg HR S1/S2 no murmur no gallop   ABD: +BS in all 4 quadrants, soft, non tender,  non distended, non palpable liver and spleen and no other masses. no hernias.  Back: no midline tenderness   Extremities: No deformities, no edema, no calf tenderness  Neuro: AA and Ox3, CNII-XII grossly intact    ACS workup   awaiting stress, dispo post stress test and re evaluation  currently stable in CDU

## 2020-05-22 NOTE — ED CDU PROVIDER SUBSEQUENT DAY NOTE - TEMPLATE, MLM
Pt withdrawn (in the milieu but keeps to himself); worked on puzzles on and off throughout the evening; incontinent around 1630; mute (responds to questions with nods or shakes of head); disheveled, untidy; did not respond when asked if depressed or anxious; COREY SI; no apparent SIB; good appetite; watched movie later in the evening        04/10/18 2124   Behavioral Health   Hallucinations denies / not responding to hallucinations   Thinking other (see comment)  (COREY)   Orientation person: oriented   Memory other (see comment)  (COREY)   Insight poor   Judgement impaired   Eye Contact into space   Affect blunted, flat   Mood mood is calm   Physical Appearance/Attire disheveled   Hygiene neglected grooming - unclean body, hair, teeth   Suicidality other (see comments)  (COREY)   Self Injury other (see comment)  (none observed)   Elopement (none)   Activity withdrawn   Speech mute   Medication Sensitivity no stated side effects;no observed side effects   Psychomotor / Gait balanced;steady   Psycho Education   Type of Intervention 1:1 intervention   Response unavailable   Hours 0.5   Treatment Detail check in    Activities of Daily Living   Hygiene/Grooming prompts   Oral Hygiene prompts   Dress scrubs (behavioral health)   Laundry unable to complete   Room Organization unable   Activity   Activity Assistance Provided assistance, stand-by   Behavioral Health Interventions   Behavioral Disturbance maintain safety precautions;monitor need to revise level of observation;maintain safe secure environment;reality orientation;simple, clear language;assist in development of alternative methods of expressive communication;encourage clear communication of needs;assist patient in developing safety plan;encourage nutrition and hydration;encourage participation / independence with adls;provide emotional support;establish therapeutic relationship;assist with developing & utilizing healthy coping strategies;provide positive feedback for  use of effective coping skills;build upon strengths   Social and Therapeutic Interventions (Behavioral Disturbance) encourage socialization with peers;encourage effective boundaries with peers;encourage participation in therapeutic groups and milieu activities          Cardiac

## 2020-06-01 ENCOUNTER — RX RENEWAL (OUTPATIENT)
Age: 85
End: 2020-06-01

## 2020-06-15 ENCOUNTER — RX RENEWAL (OUTPATIENT)
Age: 85
End: 2020-06-15

## 2020-06-22 ENCOUNTER — APPOINTMENT (OUTPATIENT)
Dept: ELECTROPHYSIOLOGY | Facility: CLINIC | Age: 85
End: 2020-06-22
Payer: MEDICARE

## 2020-06-22 PROCEDURE — 93296 REM INTERROG EVL PM/IDS: CPT

## 2020-06-22 PROCEDURE — 93294 REM INTERROG EVL PM/LDLS PM: CPT

## 2020-06-24 ENCOUNTER — NON-APPOINTMENT (OUTPATIENT)
Age: 85
End: 2020-06-24

## 2020-06-24 ENCOUNTER — APPOINTMENT (OUTPATIENT)
Dept: ELECTROPHYSIOLOGY | Facility: CLINIC | Age: 85
End: 2020-06-24
Payer: MEDICARE

## 2020-06-24 ENCOUNTER — APPOINTMENT (OUTPATIENT)
Dept: CARDIOLOGY | Facility: CLINIC | Age: 85
End: 2020-06-24
Payer: MEDICARE

## 2020-06-24 VITALS
DIASTOLIC BLOOD PRESSURE: 71 MMHG | WEIGHT: 150 LBS | HEIGHT: 69 IN | BODY MASS INDEX: 22.22 KG/M2 | OXYGEN SATURATION: 99 % | HEART RATE: 81 BPM | SYSTOLIC BLOOD PRESSURE: 147 MMHG

## 2020-06-24 DIAGNOSIS — R94.39 ABNORMAL RESULT OF OTHER CARDIOVASCULAR FUNCTION STUDY: ICD-10-CM

## 2020-06-24 PROCEDURE — 93000 ELECTROCARDIOGRAM COMPLETE: CPT

## 2020-06-24 PROCEDURE — 93280 PM DEVICE PROGR EVAL DUAL: CPT

## 2020-06-24 PROCEDURE — 99213 OFFICE O/P EST LOW 20 MIN: CPT

## 2020-06-24 NOTE — PHYSICAL EXAM
[Normal Appearance] : normal appearance [Well Groomed] : well groomed [General Appearance - Well Developed] : well developed [No Deformities] : no deformities [General Appearance - In No Acute Distress] : no acute distress [General Appearance - Well Nourished] : well nourished [Eyelids - No Xanthelasma] : the eyelids demonstrated no xanthelasmas [Normal Oral Mucosa] : normal oral mucosa [Normal Conjunctiva] : the conjunctiva exhibited no abnormalities [No Oral Pallor] : no oral pallor [No Oral Cyanosis] : no oral cyanosis [Normal Jugular Venous A Waves Present] : normal jugular venous A waves present [No Jugular Venous Langston A Waves] : no jugular venous langston A waves [Normal Jugular Venous V Waves Present] : normal jugular venous V waves present [Respiration, Rhythm And Depth] : normal respiratory rhythm and effort [Exaggerated Use Of Accessory Muscles For Inspiration] : no accessory muscle use [Heart Rate And Rhythm] : heart rate and rhythm were normal [Auscultation Breath Sounds / Voice Sounds] : lungs were clear to auscultation bilaterally [Heart Sounds] : normal S1 and S2 [Murmurs] : no murmurs present [Abdomen Tenderness] : non-tender [Abdomen Soft] : soft [Abnormal Walk] : normal gait [Abdomen Mass (___ Cm)] : no abdominal mass palpated [Nail Clubbing] : no clubbing of the fingernails [Cyanosis, Localized] : no localized cyanosis [Gait - Sufficient For Exercise Testing] : the gait was sufficient for exercise testing [Petechial Hemorrhages (___cm)] : no petechial hemorrhages [Skin Color & Pigmentation] : normal skin color and pigmentation [] : no rash [Skin Lesions] : no skin lesions [No Venous Stasis] : no venous stasis [Oriented To Time, Place, And Person] : oriented to person, place, and time [No Xanthoma] : no  xanthoma was observed [No Skin Ulcers] : no skin ulcer [Affect] : the affect was normal [Mood] : the mood was normal [No Anxiety] : not feeling anxious

## 2020-06-25 PROBLEM — R94.39 ABNORMAL STRESS TEST: Status: ACTIVE | Noted: 2020-06-25

## 2020-06-25 NOTE — HISTORY OF PRESENT ILLNESS
[FreeTextEntry1] : Garth is returning after a recent admission for CP; mild abn stress test - managed medically\par Feels well since being home\par No further CP\par No palps\par Feels weak and fatigue\par No orthopnea or palps\par No LE edema\par \par \par  \par

## 2020-06-25 NOTE — REASON FOR VISIT
[Follow-Up - Clinic] : a clinic follow-up of [Atrial Fibrillation] : atrial fibrillation [Fatigue] : feeling tired (fatigue) [Hypertension] : hypertension [Medication Management] : Medication management [Chest Pain] : chest pain

## 2020-06-25 NOTE — DISCUSSION/SUMMARY
[___ Month(s)] : [unfilled] month(s) [FreeTextEntry1] : Mr. Armstrong is a 86 y/o with HTN, Hchol, PAF, PPM  here for a follow-up after a recent admission for CP\par I do not think his stress test warrants further investigating at this point - he is asymptomatic and feeling back to his babseline. I explained to him that should he experience more symptoms to contact me and would likely need cardiac cath.\par

## 2020-08-20 NOTE — H&P CARDIOLOGY - GASTROINTESTINAL
"Jose M Wayne is a 49 y.o. female    Chief Complaint   Patient presents with   • Establish Care     History of Present Illness     New pt here to establish care.         The following portions of the patient's history were reviewed and updated as appropriate: allergies, current medications, past family history, past medical history, past social history, past surgical history and problem list.  No current outpatient medications on file.     Review of Systems   Constitutional: Negative for chills, fatigue and fever.   Respiratory: Negative for cough, chest tightness and shortness of breath.    Cardiovascular: Negative for chest pain.   Gastrointestinal: Negative for abdominal pain, diarrhea, nausea and vomiting.   Endocrine: Negative for cold intolerance and heat intolerance.   Musculoskeletal: Negative for arthralgias.   Neurological: Negative for dizziness.       Objective   Physical Exam   Constitutional: She is oriented to person, place, and time. She appears well-developed and well-nourished.   HENT:   Head: Normocephalic and atraumatic.   Eyes: Pupils are equal, round, and reactive to light. Conjunctivae and EOM are normal.   Neck: Normal range of motion.   Cardiovascular: Normal rate, regular rhythm and normal heart sounds.   Pulmonary/Chest: Effort normal and breath sounds normal.   Abdominal: Soft. Bowel sounds are normal.   Musculoskeletal: Normal range of motion.   Neurological: She is alert and oriented to person, place, and time. She has normal reflexes.   Skin: Skin is warm and dry.   Psychiatric: She has a normal mood and affect. Her behavior is normal. Judgment and thought content normal.     Vitals:    08/20/20 1059   Resp: 16   Temp: 97.3 °F (36.3 °C)   TempSrc: Infrared   Weight: 75.4 kg (166 lb 3.2 oz)   Height: 160 cm (63\")         Assessment/Plan   There are no diagnoses linked to this encounter.             This encounter was created in error - please disregard.  "
not examined

## 2020-09-14 ENCOUNTER — RX RENEWAL (OUTPATIENT)
Age: 85
End: 2020-09-14

## 2020-09-25 ENCOUNTER — APPOINTMENT (OUTPATIENT)
Dept: ELECTROPHYSIOLOGY | Facility: CLINIC | Age: 85
End: 2020-09-25
Payer: MEDICARE

## 2020-09-25 PROCEDURE — 93294 REM INTERROG EVL PM/LDLS PM: CPT

## 2020-09-25 PROCEDURE — 93296 REM INTERROG EVL PM/IDS: CPT

## 2020-09-30 ENCOUNTER — APPOINTMENT (OUTPATIENT)
Dept: CARDIOLOGY | Facility: CLINIC | Age: 85
End: 2020-09-30
Payer: MEDICARE

## 2020-09-30 VITALS — OXYGEN SATURATION: 99 % | HEART RATE: 80 BPM | DIASTOLIC BLOOD PRESSURE: 70 MMHG | SYSTOLIC BLOOD PRESSURE: 144 MMHG

## 2020-09-30 PROCEDURE — 93000 ELECTROCARDIOGRAM COMPLETE: CPT

## 2020-09-30 PROCEDURE — 99213 OFFICE O/P EST LOW 20 MIN: CPT

## 2020-09-30 RX ORDER — DIGOXIN 250 UG/1
250 TABLET ORAL DAILY
Qty: 90 | Refills: 3 | Status: DISCONTINUED | COMMUNITY
Start: 1900-01-01 | End: 2020-09-30

## 2020-10-01 ENCOUNTER — NON-APPOINTMENT (OUTPATIENT)
Age: 85
End: 2020-10-01

## 2020-10-01 NOTE — PHYSICAL EXAM
[General Appearance - Well Developed] : well developed [Normal Appearance] : normal appearance [Well Groomed] : well groomed [General Appearance - Well Nourished] : well nourished [No Deformities] : no deformities [General Appearance - In No Acute Distress] : no acute distress [Normal Conjunctiva] : the conjunctiva exhibited no abnormalities [Eyelids - No Xanthelasma] : the eyelids demonstrated no xanthelasmas [Normal Oral Mucosa] : normal oral mucosa [No Oral Pallor] : no oral pallor [No Oral Cyanosis] : no oral cyanosis [Normal Jugular Venous A Waves Present] : normal jugular venous A waves present [Normal Jugular Venous V Waves Present] : normal jugular venous V waves present [No Jugular Venous Langston A Waves] : no jugular venous langston A waves [Respiration, Rhythm And Depth] : normal respiratory rhythm and effort [Exaggerated Use Of Accessory Muscles For Inspiration] : no accessory muscle use [Auscultation Breath Sounds / Voice Sounds] : lungs were clear to auscultation bilaterally [Heart Rate And Rhythm] : heart rate and rhythm were normal [Heart Sounds] : normal S1 and S2 [Murmurs] : no murmurs present [Arterial Pulses Normal] : the arterial pulses were normal [Edema] : no peripheral edema present [Abdomen Soft] : soft [Abdomen Tenderness] : non-tender [Abdomen Mass (___ Cm)] : no abdominal mass palpated [Abnormal Walk] : normal gait [Gait - Sufficient For Exercise Testing] : the gait was sufficient for exercise testing [Nail Clubbing] : no clubbing of the fingernails [Cyanosis, Localized] : no localized cyanosis [Petechial Hemorrhages (___cm)] : no petechial hemorrhages [Skin Color & Pigmentation] : normal skin color and pigmentation [] : no rash [No Venous Stasis] : no venous stasis [Skin Lesions] : no skin lesions [No Skin Ulcers] : no skin ulcer [No Xanthoma] : no  xanthoma was observed [Oriented To Time, Place, And Person] : oriented to person, place, and time [Affect] : the affect was normal [Mood] : the mood was normal [No Anxiety] : not feeling anxious

## 2020-10-01 NOTE — DISCUSSION/SUMMARY
[___ Month(s)] : [unfilled] month(s) [FreeTextEntry1] : Mr. Armstrong is a 87 y/o with HTN, Hchol, PAF, PPM  here for a follow-up \par D/c digoxin (use with amio not necessary)\par \par Has been stable from cV perspective and is stable to proceed with cataract surgery\par No further testing necessary\par May stop Xarelto 2-3 days prior to procedure.

## 2020-10-01 NOTE — REVIEW OF SYSTEMS
[Feeling Fatigued] : feeling fatigued [Negative] : Heme/Lymph [Skin Lesions] : no skin lesions [Depression] : no depression [Suicidal] : not suicidal

## 2020-10-01 NOTE — REASON FOR VISIT
[Follow-Up - Clinic] : a clinic follow-up of [Atrial Fibrillation] : atrial fibrillation [Chest Pain] : chest pain [Hypertension] : hypertension [Medication Management] : Medication management

## 2020-10-01 NOTE — HISTORY OF PRESENT ILLNESS
[FreeTextEntry1] : Garth is returning  for a f/u and clearance for cataract surgery\par Feels well  \par No  CP\par No palps\par Feels weak and fatigue - mostly unchanged\par No orthopnea or palps\par No LE edema\par \par \par  \par

## 2020-10-21 ENCOUNTER — NON-APPOINTMENT (OUTPATIENT)
Age: 85
End: 2020-10-21

## 2020-10-26 ENCOUNTER — RX RENEWAL (OUTPATIENT)
Age: 85
End: 2020-10-26

## 2020-10-28 ENCOUNTER — APPOINTMENT (OUTPATIENT)
Dept: CARDIOLOGY | Facility: CLINIC | Age: 85
End: 2020-10-28

## 2020-12-28 ENCOUNTER — APPOINTMENT (OUTPATIENT)
Dept: ELECTROPHYSIOLOGY | Facility: CLINIC | Age: 85
End: 2020-12-28
Payer: MEDICARE

## 2020-12-28 PROCEDURE — 93296 REM INTERROG EVL PM/IDS: CPT

## 2020-12-28 PROCEDURE — 93294 REM INTERROG EVL PM/LDLS PM: CPT

## 2021-02-24 ENCOUNTER — APPOINTMENT (OUTPATIENT)
Dept: CARDIOLOGY | Facility: CLINIC | Age: 86
End: 2021-02-24
Payer: MEDICARE

## 2021-02-24 ENCOUNTER — NON-APPOINTMENT (OUTPATIENT)
Age: 86
End: 2021-02-24

## 2021-02-24 VITALS — HEART RATE: 76 BPM | DIASTOLIC BLOOD PRESSURE: 73 MMHG | SYSTOLIC BLOOD PRESSURE: 147 MMHG | OXYGEN SATURATION: 98 %

## 2021-02-24 PROCEDURE — 93000 ELECTROCARDIOGRAM COMPLETE: CPT

## 2021-02-24 PROCEDURE — 99213 OFFICE O/P EST LOW 20 MIN: CPT

## 2021-02-25 ENCOUNTER — NON-APPOINTMENT (OUTPATIENT)
Age: 86
End: 2021-02-25

## 2021-02-25 NOTE — DISCUSSION/SUMMARY
[FreeTextEntry1] : Mr. Armstrong is a 87 y/o with HTN, Hchol, PAF, PPM  here for a follow-up \par \par c/w current meds\par Amio for PAF\par BP acceptable \par Will await for labs from PCP (to monitor amio therapy)

## 2021-02-25 NOTE — HISTORY OF PRESENT ILLNESS
[FreeTextEntry1] : Garth is returning  for a f/u \par \par Feels well  \par No  CP\par No palps\par Feels weak and fatigue - which is  mostly unchanged\par No orthopnea or palps\par No LE edema\par \par \par  \par

## 2021-03-29 ENCOUNTER — APPOINTMENT (OUTPATIENT)
Dept: ELECTROPHYSIOLOGY | Facility: CLINIC | Age: 86
End: 2021-03-29
Payer: MEDICARE

## 2021-03-29 ENCOUNTER — NON-APPOINTMENT (OUTPATIENT)
Age: 86
End: 2021-03-29

## 2021-03-29 PROCEDURE — 93294 REM INTERROG EVL PM/LDLS PM: CPT

## 2021-03-29 PROCEDURE — 93296 REM INTERROG EVL PM/IDS: CPT

## 2021-06-28 ENCOUNTER — NON-APPOINTMENT (OUTPATIENT)
Age: 86
End: 2021-06-28

## 2021-06-28 ENCOUNTER — APPOINTMENT (OUTPATIENT)
Dept: ELECTROPHYSIOLOGY | Facility: CLINIC | Age: 86
End: 2021-06-28
Payer: MEDICARE

## 2021-06-28 PROCEDURE — 93294 REM INTERROG EVL PM/LDLS PM: CPT

## 2021-06-28 PROCEDURE — 93296 REM INTERROG EVL PM/IDS: CPT

## 2021-09-08 ENCOUNTER — RX RENEWAL (OUTPATIENT)
Age: 86
End: 2021-09-08

## 2021-09-28 ENCOUNTER — RESULT CHARGE (OUTPATIENT)
Age: 86
End: 2021-09-28

## 2021-09-29 ENCOUNTER — APPOINTMENT (OUTPATIENT)
Dept: ELECTROPHYSIOLOGY | Facility: CLINIC | Age: 86
End: 2021-09-29
Payer: MEDICARE

## 2021-09-29 ENCOUNTER — NON-APPOINTMENT (OUTPATIENT)
Age: 86
End: 2021-09-29

## 2021-09-29 ENCOUNTER — APPOINTMENT (OUTPATIENT)
Dept: CARDIOLOGY | Facility: CLINIC | Age: 86
End: 2021-09-29
Payer: MEDICARE

## 2021-09-29 VITALS
HEART RATE: 79 BPM | SYSTOLIC BLOOD PRESSURE: 153 MMHG | WEIGHT: 155 LBS | BODY MASS INDEX: 22.96 KG/M2 | OXYGEN SATURATION: 98 % | HEIGHT: 69 IN | DIASTOLIC BLOOD PRESSURE: 77 MMHG

## 2021-09-29 PROCEDURE — 93280 PM DEVICE PROGR EVAL DUAL: CPT

## 2021-09-29 PROCEDURE — 93000 ELECTROCARDIOGRAM COMPLETE: CPT

## 2021-09-29 PROCEDURE — 93000 ELECTROCARDIOGRAM COMPLETE: CPT | Mod: 59

## 2021-09-29 PROCEDURE — 99214 OFFICE O/P EST MOD 30 MIN: CPT

## 2021-09-30 NOTE — CARDIOLOGY SUMMARY
[de-identified] : 9/29/21\par Atrial paced Vent sensed\par [place] = 274\par -Left axis -anterior fascicular block. \par  -Poor R-wave progression -nonspecific -consider old anterior infarct. \par \par ABNORMAL \par

## 2021-09-30 NOTE — HISTORY OF PRESENT ILLNESS
[FreeTextEntry1] : Garth is returning  for a f/u \par Feels fatigue - dealing with ill wife and not sleeping well.\par No  CP\par No palps \par No orthopnea or palps\par No LE edema\par \par \par  \par

## 2021-09-30 NOTE — DISCUSSION/SUMMARY
[___ Month(s)] : in [unfilled] month(s) [FreeTextEntry1] : Mr. Armstrong is a 88 y/o with HTN, Hchol, PAF, PPM  here for a follow-up \par D/c digoxin (use with amio not necessary)\par \par Has been stable from cV perspective  \par Slightly more AF episodes since last visit (would consider switch from amio to beta blocker) \par No changes to meds for now\par

## 2021-10-11 ENCOUNTER — RX RENEWAL (OUTPATIENT)
Age: 86
End: 2021-10-11

## 2021-11-12 ENCOUNTER — EMERGENCY (EMERGENCY)
Facility: HOSPITAL | Age: 86
LOS: 1 days | Discharge: HOME | End: 2021-11-12
Attending: STUDENT IN AN ORGANIZED HEALTH CARE EDUCATION/TRAINING PROGRAM | Admitting: STUDENT IN AN ORGANIZED HEALTH CARE EDUCATION/TRAINING PROGRAM
Payer: MEDICARE

## 2021-11-12 VITALS
RESPIRATION RATE: 16 BRPM | WEIGHT: 154.98 LBS | TEMPERATURE: 97 F | HEART RATE: 84 BPM | HEIGHT: 70 IN | SYSTOLIC BLOOD PRESSURE: 158 MMHG | OXYGEN SATURATION: 99 % | DIASTOLIC BLOOD PRESSURE: 79 MMHG

## 2021-11-12 DIAGNOSIS — Z95.0 PRESENCE OF CARDIAC PACEMAKER: Chronic | ICD-10-CM

## 2021-11-12 PROCEDURE — 99283 EMERGENCY DEPT VISIT LOW MDM: CPT

## 2021-11-12 PROCEDURE — 99282 EMERGENCY DEPT VISIT SF MDM: CPT

## 2021-11-12 RX ORDER — DIGOXIN 250 MCG
1 TABLET ORAL
Qty: 0 | Refills: 0 | DISCHARGE

## 2021-11-12 NOTE — ED ADULT NURSE NOTE - OBJECTIVE STATEMENT
s/p skin lesion removed on his left temporal yesterday pt is on xarelto, started bleeding earlier today.

## 2021-11-12 NOTE — ED PROVIDER NOTE - PATIENT PORTAL LINK FT
You can access the FollowMyHealth Patient Portal offered by [facility] by registering at the following website: [email or web address]. By joining [facility]’s FollowMyHealth portal, you will also be able to view your health information using other applications (apps) compatible with our system.

## 2021-11-12 NOTE — ED PROVIDER NOTE - OBJECTIVE STATEMENT
87 year old male with a history of a-fib, CAD, HTN, PM, HLD, TB, skin CA presents with bleeding from left forehead/temple.  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  It bled profusely yesterday but was able to be controlled with pressure and heavy dressing.  It started to re-bleed tonight.  Patient denies pain, trauma, scratching the area.  He is on Xarelto.  Tetanus UTD.  Daughter at bedside states she cleaned the wound and applied pressure PTA.  PMD Dr. Mcduffie, Derm Dr. Payan

## 2021-11-12 NOTE — ED PROVIDER NOTE - NSFOLLOWUPINSTRUCTIONS_ED_ALL_ED_FT
Please keep your wound clean and dry for 24 hours.  Follow up with your dermatologist.  Return to the ER for persistent bleeding, headache, weakness, fever, pain, or any other concerns.       Incision Care, Adult    An incision is a cut that a doctor makes in your skin for surgery. Most times, these cuts are closed after surgery. Your cut from surgery may be closed with:  •Stitches (sutures).      •Staples.      •Skin glue.      •Skin tape (adhesive) strips.      You may need to return to your doctor to have stitches or staples taken out. This may happen many days or many weeks after your surgery. You need to take good care of your cut so it does not get infected. Follow instructions from your doctor about how to care for your cut.      Supplies needed:    •Soap and water.      •A clean hand towel.      •Wound cleanser.      •A clean bandage (dressing), if needed.      •Cream or ointment, if told by your doctor.      •Clean gauze.        How to care for your cut from surgery    Cleaning your cut   Ask your doctor how to clean your cut. You may need to:  •Use mild soap and water, or a wound cleanser.      •Use a clean gauze to pat your cut dry after you clean it.      Changing your bandage    •Wash your hands with soap and water for at least 20 seconds before and after you change your bandage. If you cannot use soap and water, use hand .      •Change your bandage as told by your doctor.      •Leave stitches, staples, skin glue, or skin tape strips in place. They may need to stay in place for 2 weeks or longer. If tape strips get loose and curl up, you may trim the loose edges. Do not remove tape strips completely unless your doctor says it is okay.      •Put a cream or ointment on your cut. Do this only as told.      •Cover your cut with a clean bandage.      •Ask your doctor when you can leave your cut uncovered.        Checking for infection  Check your cut area every day for signs of infection. Check for:  •More redness, swelling, or pain.      •More fluid or blood.      •Warmth.      •Pus or a bad smell.        Follow these instructions at home    Medicines     •Take over-the-counter and prescription medicines only as told by your doctor.      •If you were prescribed an antibiotic medicine, cream, or ointment, use it as told by your doctor. Do not stop using the antibiotic even if your condition improves.      Eating and drinking   •Eat foods that have a lot of certain nutrients, such as protein, vitamin A, and vitamin C. These foods help your cut heal.  •Foods rich in protein include meat, fish, eggs, dairy, beans, and nuts.      •Foods rich in vitamin A include carrots and dark green, leafy vegetables.      •Foods rich in vitamin C include citrus fruits, tomatoes, broccoli, and peppers.        •Drink enough fluid to keep your pee (urine) pale yellow.        General instructions      • Do not take baths, swim, use a hot tub, or put your cut underwater until your doctor approves. Ask your doctor if you may take showers. You may only be allowed to take sponge baths.    •Limit movement around your cut. This helps with healing.  •Try not to strain, lift, or exercise for the first 2 weeks, or for as long as told by your doctor.      •Return to your normal activities as told by your doctor. Ask your doctor what activities are safe for you.        • Do not scratch or pick at your cut. Keep it covered as told by your doctor.      •Protect your cut from the sun when you are outside for the first 6 months, or for as long as told by your doctor. Cover up the scar area or put on sunscreen that has an SPF of at least 30.      • Do not use any products that contain nicotine or tobacco, such as cigarettes, e-cigarettes, and chewing tobacco. These can delay cut healing after surgery. If you need help quitting, ask your doctor.      •Keep all follow-up visits as told by your doctor. This is important.        Contact a doctor if:  •You have any of these signs of infection around your cut:  •More redness, swelling, or pain.      •More fluid or blood.      •Warmth.      •Pus or a bad smell.        •You have a fever.      •You feel like you may vomit (nauseous).      •You vomit.      •You are dizzy.      •Your stitches, staples, skin glue, or tape strips come undone.        Get help right away if:    •Your cut has a red streak coming from it.      •Your cut bleeds through your bandage, and bleeding does not stop with gentle pressure.      •Your cut opens up and comes apart.    •Your body reacts very badly to an infection. This may include:  •A fever, chills, or feeling cold.      •Feeling mixed up, worried, or nervous.      •Very bad pain.      •Trouble breathing.      •A fast heartbeat.      •Clammy or sweaty skin.      •A rash.        These symptoms may be an emergency. Do not wait to see if the symptoms will go away. Get medical help right away. Call your local emergency services (911 in the [place] Do not drive yourself to the hospital.       Summary    •Follow instructions from your doctor about how to care for your cut from surgery.      •Wash your hands with soap and water for at least 20 seconds before and after you change your bandage. If you cannot use soap and water, use hand .      •Check your cut area every day for signs of infection.       •Keep all follow-up visits as told by your doctor. This is important.      This information is not intended to replace advice given to you by your health care provider. Make sure you discuss any questions you have with your health care provider.

## 2021-11-12 NOTE — ED PROVIDER NOTE - PHYSICAL EXAMINATION
2cm x 2cm circular wound at left temple with persistent bleeding.  No surrounding erythema, tenderness, streaks, or abscess. Non-tender to palpation

## 2021-11-12 NOTE — ED PROVIDER NOTE - PROGRESS NOTE DETAILS
Surgicel applied to wound with sterile gauze dressing.  Patient tolerated well.  Will f/u with dermatology

## 2021-11-12 NOTE — ED ADULT NURSE NOTE - NSICDXPASTMEDICALHX_GEN_ALL_CORE_FT
PAST MEDICAL HISTORY:  Asbestos exposure     CAD (coronary artery disease)     Gout     HLD (hyperlipidemia)     HTN (hypertension)     Lung nodule     Paroxysmal atrial fibrillation     Skin cancer Basal cell  removed    TB (tuberculosis)

## 2021-11-12 NOTE — ED PROVIDER NOTE - CARE PROVIDER_API CALL
Alvin Mcduffie)  Internal Medicine; Pulmonary Disease  [address] [place] [address]  Phone: [phone]  Fax: [phone]  Follow Up Time:     JEFFREY KENT  Dermatology  N  DONTE MURPHY, Phys,    Phone: ([phone]  Fax: ([phone]  Follow Up Time:

## 2021-11-12 NOTE — ED PROVIDER NOTE - CLINICAL SUMMARY MEDICAL DECISION MAKING FREE TEXT BOX
87 year old male on Xarelto with persistent bleeding s/p skin cancer removal at dermatology office yesterday.  No pain.  Clean wound, Surgicel, sterile pressure dressing, update tetanus if needed

## 2021-12-27 ENCOUNTER — NON-APPOINTMENT (OUTPATIENT)
Age: 86
End: 2021-12-27

## 2021-12-27 ENCOUNTER — APPOINTMENT (OUTPATIENT)
Dept: ELECTROPHYSIOLOGY | Facility: CLINIC | Age: 86
End: 2021-12-27
Payer: MEDICARE

## 2021-12-27 PROCEDURE — 93296 REM INTERROG EVL PM/IDS: CPT | Mod: NC

## 2021-12-27 PROCEDURE — 93294 REM INTERROG EVL PM/LDLS PM: CPT | Mod: NC

## 2022-02-03 ENCOUNTER — APPOINTMENT (OUTPATIENT)
Dept: ELECTROPHYSIOLOGY | Facility: CLINIC | Age: 87
End: 2022-02-03

## 2022-03-28 ENCOUNTER — APPOINTMENT (OUTPATIENT)
Dept: ELECTROPHYSIOLOGY | Facility: CLINIC | Age: 87
End: 2022-03-28
Payer: MEDICARE

## 2022-03-28 ENCOUNTER — NON-APPOINTMENT (OUTPATIENT)
Age: 87
End: 2022-03-28

## 2022-03-28 PROCEDURE — 93296 REM INTERROG EVL PM/IDS: CPT

## 2022-03-28 PROCEDURE — 93294 REM INTERROG EVL PM/LDLS PM: CPT

## 2022-04-09 ENCOUNTER — EMERGENCY (EMERGENCY)
Facility: HOSPITAL | Age: 87
LOS: 1 days | Discharge: HOME | End: 2022-04-09
Attending: EMERGENCY MEDICINE | Admitting: INTERNAL MEDICINE
Payer: MEDICARE

## 2022-04-09 VITALS
SYSTOLIC BLOOD PRESSURE: 161 MMHG | HEIGHT: 70 IN | WEIGHT: 160.06 LBS | TEMPERATURE: 97 F | OXYGEN SATURATION: 98 % | RESPIRATION RATE: 18 BRPM | DIASTOLIC BLOOD PRESSURE: 74 MMHG | HEART RATE: 82 BPM

## 2022-04-09 VITALS
TEMPERATURE: 97 F | SYSTOLIC BLOOD PRESSURE: 155 MMHG | OXYGEN SATURATION: 98 % | DIASTOLIC BLOOD PRESSURE: 70 MMHG | RESPIRATION RATE: 18 BRPM | HEART RATE: 80 BPM

## 2022-04-09 DIAGNOSIS — Z95.0 PRESENCE OF CARDIAC PACEMAKER: Chronic | ICD-10-CM

## 2022-04-09 LAB
ALBUMIN SERPL ELPH-MCNC: 3.8 G/DL — SIGNIFICANT CHANGE UP (ref 3.3–5)
ALP SERPL-CCNC: 123 U/L — HIGH (ref 40–120)
ALT FLD-CCNC: 72 U/L — SIGNIFICANT CHANGE UP (ref 12–78)
AMYLASE P1 CFR SERPL: 56 U/L — SIGNIFICANT CHANGE UP (ref 25–125)
ANION GAP SERPL CALC-SCNC: 6 MMOL/L — SIGNIFICANT CHANGE UP (ref 5–17)
APPEARANCE UR: CLEAR — SIGNIFICANT CHANGE UP
AST SERPL-CCNC: 145 U/L — HIGH (ref 15–37)
BACTERIA # UR AUTO: ABNORMAL
BASOPHILS # BLD AUTO: 0.05 K/UL — SIGNIFICANT CHANGE UP (ref 0–0.2)
BASOPHILS NFR BLD AUTO: 0.6 % — SIGNIFICANT CHANGE UP (ref 0–2)
BILIRUB SERPL-MCNC: 1 MG/DL — SIGNIFICANT CHANGE UP (ref 0.2–1.2)
BILIRUB UR-MCNC: NEGATIVE — SIGNIFICANT CHANGE UP
BLD GP AB SCN SERPL QL: SIGNIFICANT CHANGE UP
BUN SERPL-MCNC: 21 MG/DL — SIGNIFICANT CHANGE UP (ref 7–23)
CALCIUM SERPL-MCNC: 8.4 MG/DL — LOW (ref 8.5–10.1)
CHLORIDE SERPL-SCNC: 107 MMOL/L — SIGNIFICANT CHANGE UP (ref 96–108)
CO2 SERPL-SCNC: 29 MMOL/L — SIGNIFICANT CHANGE UP (ref 22–31)
COLOR SPEC: YELLOW — SIGNIFICANT CHANGE UP
CREAT SERPL-MCNC: 0.98 MG/DL — SIGNIFICANT CHANGE UP (ref 0.5–1.3)
DIFF PNL FLD: ABNORMAL
EGFR: 75 ML/MIN/1.73M2 — SIGNIFICANT CHANGE UP
EOSINOPHIL # BLD AUTO: 0.04 K/UL — SIGNIFICANT CHANGE UP (ref 0–0.5)
EOSINOPHIL NFR BLD AUTO: 0.5 % — SIGNIFICANT CHANGE UP (ref 0–6)
EPI CELLS # UR: SIGNIFICANT CHANGE UP
GLUCOSE SERPL-MCNC: 118 MG/DL — HIGH (ref 70–99)
GLUCOSE UR QL: NEGATIVE — SIGNIFICANT CHANGE UP
HCT VFR BLD CALC: 35.7 % — LOW (ref 39–50)
HGB BLD-MCNC: 12.1 G/DL — LOW (ref 13–17)
IMM GRANULOCYTES NFR BLD AUTO: 0.4 % — SIGNIFICANT CHANGE UP (ref 0–1.5)
KETONES UR-MCNC: ABNORMAL
LACTATE SERPL-SCNC: 0.7 MMOL/L — SIGNIFICANT CHANGE UP (ref 0.7–2)
LEUKOCYTE ESTERASE UR-ACNC: NEGATIVE — SIGNIFICANT CHANGE UP
LIDOCAIN IGE QN: 132 U/L — SIGNIFICANT CHANGE UP (ref 73–393)
LYMPHOCYTES # BLD AUTO: 0.88 K/UL — LOW (ref 1–3.3)
LYMPHOCYTES # BLD AUTO: 11.3 % — LOW (ref 13–44)
MCHC RBC-ENTMCNC: 30.3 PG — SIGNIFICANT CHANGE UP (ref 27–34)
MCHC RBC-ENTMCNC: 33.9 GM/DL — SIGNIFICANT CHANGE UP (ref 32–36)
MCV RBC AUTO: 89.5 FL — SIGNIFICANT CHANGE UP (ref 80–100)
MONOCYTES # BLD AUTO: 0.52 K/UL — SIGNIFICANT CHANGE UP (ref 0–0.9)
MONOCYTES NFR BLD AUTO: 6.7 % — SIGNIFICANT CHANGE UP (ref 2–14)
NEUTROPHILS # BLD AUTO: 6.27 K/UL — SIGNIFICANT CHANGE UP (ref 1.8–7.4)
NEUTROPHILS NFR BLD AUTO: 80.5 % — HIGH (ref 43–77)
NITRITE UR-MCNC: NEGATIVE — SIGNIFICANT CHANGE UP
NRBC # BLD: 0 /100 WBCS — SIGNIFICANT CHANGE UP (ref 0–0)
PH UR: 7 — SIGNIFICANT CHANGE UP (ref 5–8)
PLATELET # BLD AUTO: 186 K/UL — SIGNIFICANT CHANGE UP (ref 150–400)
POTASSIUM SERPL-MCNC: 3.5 MMOL/L — SIGNIFICANT CHANGE UP (ref 3.5–5.3)
POTASSIUM SERPL-SCNC: 3.5 MMOL/L — SIGNIFICANT CHANGE UP (ref 3.5–5.3)
PROT SERPL-MCNC: 6.9 G/DL — SIGNIFICANT CHANGE UP (ref 6–8.3)
PROT UR-MCNC: NEGATIVE — SIGNIFICANT CHANGE UP
RBC # BLD: 3.99 M/UL — LOW (ref 4.2–5.8)
RBC # FLD: 14.3 % — SIGNIFICANT CHANGE UP (ref 10.3–14.5)
RBC CASTS # UR COMP ASSIST: ABNORMAL /HPF (ref 0–4)
SARS-COV-2 RNA SPEC QL NAA+PROBE: SIGNIFICANT CHANGE UP
SODIUM SERPL-SCNC: 142 MMOL/L — SIGNIFICANT CHANGE UP (ref 135–145)
SP GR SPEC: 1.01 — SIGNIFICANT CHANGE UP (ref 1.01–1.02)
TROPONIN I, HIGH SENSITIVITY RESULT: 10 NG/L — SIGNIFICANT CHANGE UP
TROPONIN I, HIGH SENSITIVITY RESULT: 8.4 NG/L — SIGNIFICANT CHANGE UP
UROBILINOGEN FLD QL: NEGATIVE — SIGNIFICANT CHANGE UP
WBC # BLD: 7.79 K/UL — SIGNIFICANT CHANGE UP (ref 3.8–10.5)
WBC # FLD AUTO: 7.79 K/UL — SIGNIFICANT CHANGE UP (ref 3.8–10.5)
WBC UR QL: SIGNIFICANT CHANGE UP

## 2022-04-09 PROCEDURE — 74177 CT ABD & PELVIS W/CONTRAST: CPT | Mod: MA

## 2022-04-09 PROCEDURE — 81001 URINALYSIS AUTO W/SCOPE: CPT

## 2022-04-09 PROCEDURE — 84484 ASSAY OF TROPONIN QUANT: CPT

## 2022-04-09 PROCEDURE — 85025 COMPLETE CBC W/AUTO DIFF WBC: CPT

## 2022-04-09 PROCEDURE — 83690 ASSAY OF LIPASE: CPT

## 2022-04-09 PROCEDURE — 86901 BLOOD TYPING SEROLOGIC RH(D): CPT

## 2022-04-09 PROCEDURE — 86850 RBC ANTIBODY SCREEN: CPT

## 2022-04-09 PROCEDURE — 82150 ASSAY OF AMYLASE: CPT

## 2022-04-09 PROCEDURE — 74177 CT ABD & PELVIS W/CONTRAST: CPT | Mod: 26,MA

## 2022-04-09 PROCEDURE — 99285 EMERGENCY DEPT VISIT HI MDM: CPT

## 2022-04-09 PROCEDURE — 83605 ASSAY OF LACTIC ACID: CPT

## 2022-04-09 PROCEDURE — 93010 ELECTROCARDIOGRAM REPORT: CPT

## 2022-04-09 PROCEDURE — 86900 BLOOD TYPING SEROLOGIC ABO: CPT

## 2022-04-09 PROCEDURE — 93005 ELECTROCARDIOGRAM TRACING: CPT

## 2022-04-09 PROCEDURE — 99285 EMERGENCY DEPT VISIT HI MDM: CPT | Mod: 25

## 2022-04-09 PROCEDURE — 71045 X-RAY EXAM CHEST 1 VIEW: CPT | Mod: 26

## 2022-04-09 PROCEDURE — 87086 URINE CULTURE/COLONY COUNT: CPT

## 2022-04-09 PROCEDURE — 71045 X-RAY EXAM CHEST 1 VIEW: CPT

## 2022-04-09 PROCEDURE — U0003: CPT

## 2022-04-09 PROCEDURE — 80053 COMPREHEN METABOLIC PANEL: CPT

## 2022-04-09 PROCEDURE — 36415 COLL VENOUS BLD VENIPUNCTURE: CPT

## 2022-04-09 PROCEDURE — U0005: CPT

## 2022-04-09 RX ORDER — SODIUM CHLORIDE 9 MG/ML
1000 INJECTION INTRAMUSCULAR; INTRAVENOUS; SUBCUTANEOUS ONCE
Refills: 0 | Status: COMPLETED | OUTPATIENT
Start: 2022-04-09 | End: 2022-04-09

## 2022-04-09 RX ADMIN — SODIUM CHLORIDE 1000 MILLILITER(S): 9 INJECTION INTRAMUSCULAR; INTRAVENOUS; SUBCUTANEOUS at 16:05

## 2022-04-09 NOTE — ED PROVIDER NOTE - CARE PROVIDER_API CALL
Scott Lu ()  Internal Medicine  [address] [place] [address]  Phone: [phone]  Fax: [phone]  Follow Up Time: 1-3 Days

## 2022-04-09 NOTE — ED PROVIDER NOTE - PROGRESS NOTE DETAILS
pt is sitting nexty to exam bed, comfortable asking for a meal. I reviewed ct labs ua with pt and daughter at bedside copies of results provided and significant findings highlighted including the pancreatic mass, lymphanagetic lesion in abd dilated biliary ducts this was emphasized to pt and daughter that he needs follow up with gi for mrcp or ercp they ack.  agree to second trop and po challenge if darrion will dc. Reevaluated patient at bedside.  Patient feeling much improved.  Abdomen is now soft, non-tender. Discussed the results of all diagnostic testing in ED.  An opportunity to ask questions was given.  Discussed the nature of diagnostic testing in the abdomen and the importance of continued reevaluation.  Understanding of the potential risk of occult pathology was verbalized and the patient will continue to follow-up as an outpatient for further workup and evaluation until resolution.  Discussed the importance of prompt, close medical follow-up.  Patient will return with any changes, concerns or persistent / worsening symptoms.  Tolerates PO challenge

## 2022-04-09 NOTE — ED PROVIDER NOTE - PATIENT PORTAL LINK FT
You can access the FollowMyHealth Patient Portal offered by [facility] by registering at the following website: [email or web address]. By joining [facility]’s FollowMyHealth portal, you will also be able to view your health information using other applications (apps) compatible with our system.

## 2022-04-09 NOTE — ED ADULT NURSE NOTE - NSIMPLEMENTINTERV_GEN_ALL_ED
Implemented All Universal Safety Interventions:  [place] to call system. Call bell, personal items and telephone within reach. Instruct patient to call for assistance. Room bathroom lighting operational. Non-slip footwear when patient is off stretcher. Physically safe environment: no spills, clutter or unnecessary equipment. Stretcher in lowest position, wheels locked, appropriate side rails in place.

## 2022-04-09 NOTE — ED PROVIDER NOTE - CLINICAL SUMMARY MEDICAL DECISION MAKING FREE TEXT BOX
pt is a 86 yo male whose pmd is dr Alvin Streeter CArds is dr Juma Gaspar hx of PPM ACS, CAD, paf, skin cancer, tb asbestos exposure, lung nodules, htn hld sp gb removal had just eaten and developed sudden severe pain in his epigastrium that went to his back. according to daughter at bedside, pt turned pale and asked to be taken tot  doctor because the pain was so severe.  they called 911 bib ems pt now feels better and is no longer having pian in epigastrium or back.  ro aaa ro pancreatitis ro acs ro pn ro ptx ro gastritis ro other causes  of sudden severe epigastric pain in adult male who has hx of sig cad  xray ekg ct labs cardiac enzymes type and screen pain mgt as needed.

## 2022-04-09 NOTE — ED PROVIDER NOTE - OBJECTIVE STATEMENT
pt is a 86 yo male whose pmd is dr Alvin Streeter CArds is dr Juma Gaspar hx of PPM ACS, CAD, paf, skin cancer, tb asbestos exposure, lung nodules, htn hld sp gb removal had just eaten and developed sudden severe pain in his epigastrium that went to his back. according to daughter at bedside, pt turned pale and asked to be taken tot  doctor because the pain was so severe.  they called 911 bib ems pt now feels better and is no longer having pian in epigastrium or back.

## 2022-04-09 NOTE — ED ADULT NURSE NOTE - OBJECTIVE STATEMENT
Pt states that he developed epigastric pain after eating. pt states that pain radiated to the L side of his back. As per pt's daughter pt was diaphemetric too. Pt denies CP, palpitations, and SOB at this time.

## 2022-04-09 NOTE — ED PROVIDER NOTE - GASTROINTESTINAL, MLM
Abdomen soft, non-tender, no guarding. no rebound no cvat there is a well healed midline upper abd scar

## 2022-04-09 NOTE — ED ADULT NURSE NOTE - CAS TRG GEN SKIN CONDITION
Mother is calling concerned about the measles. Her son has cough, red eyes, rash on body, runny nose. Negative for international travel. Temperature is at 98.6.     Mother advised ER for assessment. Younger child is now present with similar issues. Was advised to have both seen.    Maria Lopez RN, [facility] Triage      Warm

## 2022-04-11 LAB
CULTURE RESULTS: SIGNIFICANT CHANGE UP
SPECIMEN SOURCE: SIGNIFICANT CHANGE UP

## 2022-06-16 ENCOUNTER — NON-APPOINTMENT (OUTPATIENT)
Age: 87
End: 2022-06-16

## 2022-06-16 ENCOUNTER — APPOINTMENT (OUTPATIENT)
Dept: CARDIOLOGY | Facility: CLINIC | Age: 87
End: 2022-06-16

## 2022-06-16 VITALS
HEIGHT: 69 IN | DIASTOLIC BLOOD PRESSURE: 73 MMHG | HEART RATE: 81 BPM | BODY MASS INDEX: 23.7 KG/M2 | OXYGEN SATURATION: 99 % | SYSTOLIC BLOOD PRESSURE: 122 MMHG | WEIGHT: 160 LBS

## 2022-06-16 PROCEDURE — 99213 OFFICE O/P EST LOW 20 MIN: CPT

## 2022-06-16 PROCEDURE — 93000 ELECTROCARDIOGRAM COMPLETE: CPT

## 2022-06-19 NOTE — CARDIOLOGY SUMMARY
[de-identified] : 6/16/22\par Undetermined   Rhythm  -First degree A-V block  -With run of ventricular ectopic beats \par Keyana = 296\par -Left axis -anterior fascicular block. \par  -Left atrial enlargement. \par  -Poor R-wave progression -nonspecific -consider old anterior infarct. \par \par ABNORMAL

## 2022-06-19 NOTE — DISCUSSION/SUMMARY
[___ Month(s)] : in [unfilled] month(s) [FreeTextEntry1] : Mr. Armstrong is a 88 y/o with HTN, Hchol, PAF, PPM  here for a follow-up \par \par Stable from cV perspective   \par No changes to meds for now\par

## 2022-06-19 NOTE — HISTORY OF PRESENT ILLNESS
[FreeTextEntry1] : Garth is returning  for a f/u \par Feels fatigue - dealing with ill wife  \par No  CP\par No palps \par No orthopnea or palps\par No LE edema\par \par \par  \par

## 2022-06-28 ENCOUNTER — NON-APPOINTMENT (OUTPATIENT)
Age: 87
End: 2022-06-28

## 2022-06-28 ENCOUNTER — APPOINTMENT (OUTPATIENT)
Dept: ELECTROPHYSIOLOGY | Facility: CLINIC | Age: 87
End: 2022-06-28

## 2022-06-28 PROCEDURE — 93294 REM INTERROG EVL PM/LDLS PM: CPT

## 2022-06-28 PROCEDURE — 93296 REM INTERROG EVL PM/IDS: CPT

## 2022-08-01 ENCOUNTER — RX RENEWAL (OUTPATIENT)
Age: 87
End: 2022-08-01

## 2022-08-11 ENCOUNTER — RX RENEWAL (OUTPATIENT)
Age: 87
End: 2022-08-11

## 2022-09-27 ENCOUNTER — NON-APPOINTMENT (OUTPATIENT)
Age: 87
End: 2022-09-27

## 2022-09-27 ENCOUNTER — APPOINTMENT (OUTPATIENT)
Dept: ELECTROPHYSIOLOGY | Facility: CLINIC | Age: 87
End: 2022-09-27

## 2022-09-27 PROCEDURE — 93294 REM INTERROG EVL PM/LDLS PM: CPT

## 2022-09-27 PROCEDURE — 93296 REM INTERROG EVL PM/IDS: CPT

## 2022-12-27 ENCOUNTER — NON-APPOINTMENT (OUTPATIENT)
Age: 87
End: 2022-12-27

## 2022-12-27 ENCOUNTER — APPOINTMENT (OUTPATIENT)
Dept: ELECTROPHYSIOLOGY | Facility: CLINIC | Age: 87
End: 2022-12-27
Payer: MEDICARE

## 2022-12-27 PROCEDURE — 93296 REM INTERROG EVL PM/IDS: CPT

## 2022-12-27 PROCEDURE — 93294 REM INTERROG EVL PM/LDLS PM: CPT

## 2022-12-29 ENCOUNTER — NON-APPOINTMENT (OUTPATIENT)
Age: 87
End: 2022-12-29

## 2023-01-17 ENCOUNTER — NON-APPOINTMENT (OUTPATIENT)
Age: 88
End: 2023-01-17

## 2023-01-17 ENCOUNTER — APPOINTMENT (OUTPATIENT)
Dept: CARDIOLOGY | Facility: CLINIC | Age: 88
End: 2023-01-17
Payer: MEDICARE

## 2023-01-17 VITALS
SYSTOLIC BLOOD PRESSURE: 126 MMHG | DIASTOLIC BLOOD PRESSURE: 77 MMHG | OXYGEN SATURATION: 98 % | WEIGHT: 160 LBS | BODY MASS INDEX: 23.63 KG/M2 | HEART RATE: 86 BPM

## 2023-01-17 PROCEDURE — 93000 ELECTROCARDIOGRAM COMPLETE: CPT

## 2023-01-17 PROCEDURE — 99214 OFFICE O/P EST MOD 30 MIN: CPT

## 2023-01-18 NOTE — CARDIOLOGY SUMMARY
[de-identified] : 1/17/23\par Sinus  Rhythm  -First degree A-V block  -With run of ventricular ectopic beats \par Keyana = 320\par -Left axis -anterior fascicular block. \par  -Poor R-wave progression -may be secondary to pulmonary disease   consider old anterior infarct. \par  Low voltage -possible pulmonary disease. \par

## 2023-01-18 NOTE — DISCUSSION/SUMMARY
[FreeTextEntry1] : Mr. Armstrong is a 87 y/o with HTN, Hchol, PAF, PPM  here for a follow-up \par \par Stable from cV perspective   (more AF on device and in AF today - but rate controlled and asymptomatic)\par No changes to meds for now\par  [EKG obtained to assist in diagnosis and management of assessed problem(s)] : EKG obtained to assist in diagnosis and management of assessed problem(s)

## 2023-01-18 NOTE — HISTORY OF PRESENT ILLNESS
[FreeTextEntry1] : Garth is returning  for a f/u \par Feels fatigue - dealing with ill wife  \par No  CP\par No palps (despite more episodes of AF on device)\par No orthopnea or palps\par No LE edema\par \par \par  \par

## 2023-02-28 ENCOUNTER — RX RENEWAL (OUTPATIENT)
Age: 88
End: 2023-02-28

## 2023-03-28 ENCOUNTER — APPOINTMENT (OUTPATIENT)
Dept: ELECTROPHYSIOLOGY | Facility: CLINIC | Age: 88
End: 2023-03-28
Payer: MEDICARE

## 2023-03-28 ENCOUNTER — NON-APPOINTMENT (OUTPATIENT)
Age: 88
End: 2023-03-28

## 2023-03-28 PROCEDURE — 93296 REM INTERROG EVL PM/IDS: CPT

## 2023-03-28 PROCEDURE — 93294 REM INTERROG EVL PM/LDLS PM: CPT

## 2023-05-08 ENCOUNTER — NON-APPOINTMENT (OUTPATIENT)
Age: 88
End: 2023-05-08

## 2023-06-20 ENCOUNTER — RX RENEWAL (OUTPATIENT)
Age: 88
End: 2023-06-20

## 2023-06-27 ENCOUNTER — APPOINTMENT (OUTPATIENT)
Dept: ELECTROPHYSIOLOGY | Facility: CLINIC | Age: 88
End: 2023-06-27
Payer: MEDICARE

## 2023-06-27 ENCOUNTER — NON-APPOINTMENT (OUTPATIENT)
Age: 88
End: 2023-06-27

## 2023-06-27 PROCEDURE — 93296 REM INTERROG EVL PM/IDS: CPT

## 2023-06-27 PROCEDURE — 93294 REM INTERROG EVL PM/LDLS PM: CPT

## 2023-07-13 ENCOUNTER — APPOINTMENT (OUTPATIENT)
Dept: CARDIOLOGY | Facility: CLINIC | Age: 88
End: 2023-07-13
Payer: MEDICARE

## 2023-07-13 ENCOUNTER — NON-APPOINTMENT (OUTPATIENT)
Age: 88
End: 2023-07-13

## 2023-07-13 VITALS
SYSTOLIC BLOOD PRESSURE: 125 MMHG | BODY MASS INDEX: 22.96 KG/M2 | HEIGHT: 69 IN | WEIGHT: 155 LBS | DIASTOLIC BLOOD PRESSURE: 70 MMHG | HEART RATE: 91 BPM | TEMPERATURE: 97.7 F | OXYGEN SATURATION: 94 %

## 2023-07-13 PROCEDURE — 99214 OFFICE O/P EST MOD 30 MIN: CPT

## 2023-07-13 PROCEDURE — 93000 ELECTROCARDIOGRAM COMPLETE: CPT

## 2023-07-14 NOTE — CARDIOLOGY SUMMARY
[de-identified] : 7/13/23\par Atrial fibrillation \par -Left axis -anterior fascicular block. \par \par ABNORMAL \par

## 2023-07-14 NOTE — DISCUSSION/SUMMARY
[FreeTextEntry1] : Mr. Armstrong is a 87 y/o with HTN, Hchol, PAF, PPM  here for a follow-up \par \par Stable from cV perspective   \par No changes to meds for now\par No recent labs - reordered and emphasized their importance [EKG obtained to assist in diagnosis and management of assessed problem(s)] : EKG obtained to assist in diagnosis and management of assessed problem(s)

## 2023-07-14 NOTE — HISTORY OF PRESENT ILLNESS
[FreeTextEntry1] : Garth is returning  for a f/u \par Feels fatigue  \par No  CP\par No palps on Amio for rate control\par No orthopnea or palps\par No LE edema\par \par \par  \par

## 2023-08-07 ENCOUNTER — RX RENEWAL (OUTPATIENT)
Age: 88
End: 2023-08-07

## 2023-09-26 ENCOUNTER — NON-APPOINTMENT (OUTPATIENT)
Age: 88
End: 2023-09-26

## 2023-09-26 ENCOUNTER — APPOINTMENT (OUTPATIENT)
Dept: ELECTROPHYSIOLOGY | Facility: CLINIC | Age: 88
End: 2023-09-26
Payer: MEDICARE

## 2023-09-26 PROCEDURE — 93296 REM INTERROG EVL PM/IDS: CPT

## 2023-09-26 PROCEDURE — 93294 REM INTERROG EVL PM/LDLS PM: CPT

## 2023-12-12 ENCOUNTER — APPOINTMENT (OUTPATIENT)
Dept: CARDIOLOGY | Facility: CLINIC | Age: 88
End: 2023-12-12

## 2023-12-12 ENCOUNTER — APPOINTMENT (OUTPATIENT)
Dept: CV DIAGNOSITCS | Facility: HOSPITAL | Age: 88
End: 2023-12-12

## 2023-12-26 ENCOUNTER — APPOINTMENT (OUTPATIENT)
Dept: ELECTROPHYSIOLOGY | Facility: CLINIC | Age: 88
End: 2023-12-26
Payer: MEDICARE

## 2023-12-26 ENCOUNTER — NON-APPOINTMENT (OUTPATIENT)
Age: 88
End: 2023-12-26

## 2023-12-26 PROCEDURE — 93296 REM INTERROG EVL PM/IDS: CPT

## 2023-12-26 PROCEDURE — 93294 REM INTERROG EVL PM/LDLS PM: CPT

## 2024-01-25 ENCOUNTER — NON-APPOINTMENT (OUTPATIENT)
Age: 89
End: 2024-01-25

## 2024-01-25 ENCOUNTER — APPOINTMENT (OUTPATIENT)
Dept: CARDIOLOGY | Facility: CLINIC | Age: 89
End: 2024-01-25
Payer: MEDICARE

## 2024-01-25 VITALS
BODY MASS INDEX: 22.96 KG/M2 | HEIGHT: 69 IN | TEMPERATURE: 98 F | WEIGHT: 155 LBS | HEART RATE: 99 BPM | OXYGEN SATURATION: 96 % | DIASTOLIC BLOOD PRESSURE: 77 MMHG | SYSTOLIC BLOOD PRESSURE: 143 MMHG

## 2024-01-25 PROCEDURE — 93000 ELECTROCARDIOGRAM COMPLETE: CPT

## 2024-01-25 PROCEDURE — 99214 OFFICE O/P EST MOD 30 MIN: CPT

## 2024-01-26 NOTE — PHYSICAL EXAM
[Well Developed] : well developed [Well Nourished] : well nourished [No Acute Distress] : no acute distress [Frail] : frail [Normal Conjunctiva] : normal conjunctiva [Normal Venous Pressure] : normal venous pressure [No Carotid Bruit] : no carotid bruit [Normal S1, S2] : normal S1, S2 [No Rub] : no rub [No Gallop] : no gallop [Murmur] : murmur [Clear Lung Fields] : clear lung fields [No Respiratory Distress] : no respiratory distress  [Good Air Entry] : good air entry [Non Tender] : non-tender [Soft] : abdomen soft [Normal Bowel Sounds] : normal bowel sounds [No Masses/organomegaly] : no masses/organomegaly [No Edema] : no edema [Normal Gait] : normal gait [No Cyanosis] : no cyanosis [No Clubbing] : no clubbing [No Varicosities] : no varicosities [No Skin Lesions] : no skin lesions [No Rash] : no rash [Moves all extremities] : moves all extremities [No Focal Deficits] : no focal deficits [Normal Speech] : normal speech [Alert and Oriented] : alert and oriented [Normal memory] : normal memory [de-identified] : 2/6

## 2024-01-26 NOTE — CARDIOLOGY SUMMARY
[de-identified] : 1/25/24 Sinus Rhythm  -Nonspecific QRS widening and anterior fascicular block. -Left atrial enlargement. -Nonspecific ST depression -Nondiagnostic.  ABNORMAL

## 2024-01-26 NOTE — HISTORY OF PRESENT ILLNESS
[FreeTextEntry1] : Garth is returning  for a f/u  His wife recently  from [place]  Feels fatigue   No  CP No palps - on Amio for rate control No orthopnea or palps No LE edema

## 2024-02-14 NOTE — ED PROVIDER NOTE - INTERNATIONAL TRAVEL
Post-Care Instructions: I reviewed with the patient in detail post-care instructions. Patient is to avoid sunlight for the next 2 days, and wear sun protection. Patients may expect sunburn like redness, discomfort and scabbing. Incubation Time: 02:00:00 Show Anesthesia In Plan?: Yes Number Of Kerasticks/Tubes Billed For: 1 Total Number Of Aks Treated (Optional To Report): 0 Anesthesia Type: 1% lidocaine with epinephrine Incubation Start Time: 11:15 Detail Level: Zone Incubation End Time: 1:15 Who Performed The Pdt (Staff): Maria Teresa Lot # (Optional): [id number] Which Photosensitizer Was Used: Levulan Medical Necessity: Precancerous Lesions Debridement Text (Will Only Render In Visit Note If You Select Debridement Option Under Who Performed The Pdt Field): Prior to application of the photodynamic medication the hyperkeratotic lesions were curetted to make them more amenable to therapy. Occlusion: No Illumination Time: 00:16:40 Expiration Date (Optional): 2026-10 Light Source: Marcos-U Who Performed The Pdt?: Performed by Nurse, MA or Aesthetician ([id number]) Pre-Procedure Text: The treatment areas were cleaned and prepped in the usual fashion. Ndc# (Optional): 80161-173-55 Consent: Written consent obtained.  The risks were reviewed with the patient including but not limited to: pigmentary changes, pain, blistering, scabbing, redness, and the remote possibility of scarring. No

## 2024-03-20 ENCOUNTER — OUTPATIENT (OUTPATIENT)
Dept: OUTPATIENT SERVICES | Facility: HOSPITAL | Age: 89
LOS: 1 days | End: 2024-03-20
Payer: MEDICARE

## 2024-03-20 ENCOUNTER — RESULT REVIEW (OUTPATIENT)
Age: 89
End: 2024-03-20

## 2024-03-20 ENCOUNTER — INPATIENT (INPATIENT)
Facility: HOSPITAL | Age: 89
LOS: 1 days | Discharge: HOME | DRG: 310 | End: 2024-03-22
Attending: INTERNAL MEDICINE | Admitting: INTERNAL MEDICINE
Payer: MEDICARE

## 2024-03-20 ENCOUNTER — APPOINTMENT (OUTPATIENT)
Dept: CV DIAGNOSITCS | Facility: HOSPITAL | Age: 89
End: 2024-03-20

## 2024-03-20 VITALS
HEART RATE: 109 BPM | HEIGHT: 70 IN | RESPIRATION RATE: 16 BRPM | WEIGHT: 154.98 LBS | DIASTOLIC BLOOD PRESSURE: 85 MMHG | OXYGEN SATURATION: 97 % | TEMPERATURE: 97 F | SYSTOLIC BLOOD PRESSURE: 135 MMHG

## 2024-03-20 DIAGNOSIS — Z95.0 PRESENCE OF CARDIAC PACEMAKER: Chronic | ICD-10-CM

## 2024-03-20 DIAGNOSIS — I10 ESSENTIAL (PRIMARY) HYPERTENSION: ICD-10-CM

## 2024-03-20 DIAGNOSIS — I48.92 UNSPECIFIED ATRIAL FLUTTER: ICD-10-CM

## 2024-03-20 LAB
ALBUMIN SERPL ELPH-MCNC: 4.8 G/DL — SIGNIFICANT CHANGE UP (ref 3.3–5)
ALP SERPL-CCNC: 54 U/L — SIGNIFICANT CHANGE UP (ref 40–120)
ALT FLD-CCNC: 13 U/L — SIGNIFICANT CHANGE UP (ref 10–45)
ANION GAP SERPL CALC-SCNC: 13 MMOL/L — SIGNIFICANT CHANGE UP (ref 5–17)
APTT BLD: 40.8 SEC — HIGH (ref 24.5–35.6)
AST SERPL-CCNC: 29 U/L — SIGNIFICANT CHANGE UP (ref 10–40)
BASOPHILS # BLD AUTO: 0.03 K/UL — SIGNIFICANT CHANGE UP (ref 0–0.2)
BASOPHILS NFR BLD AUTO: 0.4 % — SIGNIFICANT CHANGE UP (ref 0–2)
BILIRUB SERPL-MCNC: 0.8 MG/DL — SIGNIFICANT CHANGE UP (ref 0.2–1.2)
BUN SERPL-MCNC: 19 MG/DL — SIGNIFICANT CHANGE UP (ref 7–23)
CALCIUM SERPL-MCNC: 9.9 MG/DL — SIGNIFICANT CHANGE UP (ref 8.4–10.5)
CHLORIDE SERPL-SCNC: 104 MMOL/L — SIGNIFICANT CHANGE UP (ref 96–108)
CO2 SERPL-SCNC: 25 MMOL/L — SIGNIFICANT CHANGE UP (ref 22–31)
CREAT SERPL-MCNC: 0.94 MG/DL — SIGNIFICANT CHANGE UP (ref 0.5–1.3)
EGFR: 77 ML/MIN/1.73M2 — SIGNIFICANT CHANGE UP
EOSINOPHIL # BLD AUTO: 0 K/UL — SIGNIFICANT CHANGE UP (ref 0–0.5)
EOSINOPHIL NFR BLD AUTO: 0 % — SIGNIFICANT CHANGE UP (ref 0–6)
GLUCOSE SERPL-MCNC: 112 MG/DL — HIGH (ref 70–99)
HCT VFR BLD CALC: 42.5 % — SIGNIFICANT CHANGE UP (ref 39–50)
HGB BLD-MCNC: 14 G/DL — SIGNIFICANT CHANGE UP (ref 13–17)
IMM GRANULOCYTES NFR BLD AUTO: 0.4 % — SIGNIFICANT CHANGE UP (ref 0–0.9)
INR BLD: 2.43 RATIO — HIGH (ref 0.85–1.18)
LYMPHOCYTES # BLD AUTO: 1.18 K/UL — SIGNIFICANT CHANGE UP (ref 1–3.3)
LYMPHOCYTES # BLD AUTO: 16.1 % — SIGNIFICANT CHANGE UP (ref 13–44)
MCHC RBC-ENTMCNC: 30 PG — SIGNIFICANT CHANGE UP (ref 27–34)
MCHC RBC-ENTMCNC: 32.9 GM/DL — SIGNIFICANT CHANGE UP (ref 32–36)
MCV RBC AUTO: 91.2 FL — SIGNIFICANT CHANGE UP (ref 80–100)
MONOCYTES # BLD AUTO: 0.47 K/UL — SIGNIFICANT CHANGE UP (ref 0–0.9)
MONOCYTES NFR BLD AUTO: 6.4 % — SIGNIFICANT CHANGE UP (ref 2–14)
NEUTROPHILS # BLD AUTO: 5.6 K/UL — SIGNIFICANT CHANGE UP (ref 1.8–7.4)
NEUTROPHILS NFR BLD AUTO: 76.7 % — SIGNIFICANT CHANGE UP (ref 43–77)
NRBC # BLD: 0 /100 WBCS — SIGNIFICANT CHANGE UP (ref 0–0)
NT-PROBNP SERPL-SCNC: 1059 PG/ML — HIGH (ref 0–300)
PLATELET # BLD AUTO: 185 K/UL — SIGNIFICANT CHANGE UP (ref 150–400)
POTASSIUM SERPL-MCNC: 3.9 MMOL/L — SIGNIFICANT CHANGE UP (ref 3.5–5.3)
POTASSIUM SERPL-SCNC: 3.9 MMOL/L — SIGNIFICANT CHANGE UP (ref 3.5–5.3)
PROT SERPL-MCNC: 7.5 G/DL — SIGNIFICANT CHANGE UP (ref 6–8.3)
PROTHROM AB SERPL-ACNC: 24.9 SEC — HIGH (ref 9.5–13)
RBC # BLD: 4.66 M/UL — SIGNIFICANT CHANGE UP (ref 4.2–5.8)
RBC # FLD: 14.6 % — HIGH (ref 10.3–14.5)
SODIUM SERPL-SCNC: 142 MMOL/L — SIGNIFICANT CHANGE UP (ref 135–145)
TROPONIN T, HIGH SENSITIVITY RESULT: 18 NG/L — SIGNIFICANT CHANGE UP (ref 0–51)
WBC # BLD: 7.31 K/UL — SIGNIFICANT CHANGE UP (ref 3.8–10.5)
WBC # FLD AUTO: 7.31 K/UL — SIGNIFICANT CHANGE UP (ref 3.8–10.5)

## 2024-03-20 PROCEDURE — 71045 X-RAY EXAM CHEST 1 VIEW: CPT | Mod: 26

## 2024-03-20 PROCEDURE — 99285 EMERGENCY DEPT VISIT HI MDM: CPT | Mod: FS

## 2024-03-20 PROCEDURE — 99223 1ST HOSP IP/OBS HIGH 75: CPT | Mod: GC

## 2024-03-20 PROCEDURE — 93306 TTE W/DOPPLER COMPLETE: CPT | Mod: 26

## 2024-03-20 RX ORDER — AMIODARONE HYDROCHLORIDE 400 MG/1
1 TABLET ORAL
Qty: 0 | Refills: 0 | DISCHARGE

## 2024-03-20 RX ORDER — ROSUVASTATIN CALCIUM 5 MG/1
1 TABLET ORAL
Refills: 0 | DISCHARGE

## 2024-03-20 RX ORDER — ALLOPURINOL 300 MG
300 TABLET ORAL DAILY
Refills: 0 | Status: DISCONTINUED | OUTPATIENT
Start: 2024-03-20 | End: 2024-03-22

## 2024-03-20 RX ORDER — AMLODIPINE BESYLATE AND BENAZEPRIL HYDROCHLORIDE 10; 20 MG/1; MG/1
1 CAPSULE ORAL
Qty: 0 | Refills: 0 | DISCHARGE

## 2024-03-20 RX ORDER — RIVAROXABAN 15 MG-20MG
1 KIT ORAL
Refills: 0 | DISCHARGE

## 2024-03-20 RX ORDER — SODIUM CHLORIDE 9 MG/ML
500 INJECTION, SOLUTION INTRAVENOUS ONCE
Refills: 0 | Status: COMPLETED | OUTPATIENT
Start: 2024-03-20 | End: 2024-03-20

## 2024-03-20 RX ORDER — RIVAROXABAN 15 MG-20MG
20 KIT ORAL
Refills: 0 | Status: DISCONTINUED | OUTPATIENT
Start: 2024-03-20 | End: 2024-03-22

## 2024-03-20 RX ORDER — ATORVASTATIN CALCIUM 80 MG/1
20 TABLET, FILM COATED ORAL AT BEDTIME
Refills: 0 | Status: DISCONTINUED | OUTPATIENT
Start: 2024-03-20 | End: 2024-03-22

## 2024-03-20 RX ORDER — METOPROLOL TARTRATE 50 MG
25 TABLET ORAL
Refills: 0 | Status: DISCONTINUED | OUTPATIENT
Start: 2024-03-20 | End: 2024-03-21

## 2024-03-20 RX ORDER — ALLOPURINOL 300 MG
1 TABLET ORAL
Qty: 0 | Refills: 0 | DISCHARGE

## 2024-03-20 RX ORDER — METOPROLOL TARTRATE 50 MG
100 TABLET ORAL ONCE
Refills: 0 | Status: COMPLETED | OUTPATIENT
Start: 2024-03-20 | End: 2024-03-20

## 2024-03-20 RX ORDER — AMLODIPINE BESYLATE 2.5 MG/1
5 TABLET ORAL DAILY
Refills: 0 | Status: DISCONTINUED | OUTPATIENT
Start: 2024-03-20 | End: 2024-03-22

## 2024-03-20 RX ADMIN — SODIUM CHLORIDE 500 MILLILITER(S): 9 INJECTION, SOLUTION INTRAVENOUS at 16:25

## 2024-03-20 RX ADMIN — SODIUM CHLORIDE 500 MILLILITER(S): 9 INJECTION, SOLUTION INTRAVENOUS at 18:58

## 2024-03-20 RX ADMIN — Medication 100 MILLIGRAM(S): at 17:48

## 2024-03-20 NOTE — CONSULT NOTE ADULT - ASSESSMENT
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 He feels well with no complaints. TTE c/f possible RA mass/possible thrombus. His PPM is functioning well based on last remote report from December 2023.    Recommendations  - Initiate lopressor 25 mg Q12H for rate control - can uptitrate as needed  - Patient states he no longer takes amiodarone - will hold   - Continue Xarelto for now - will discuss with echocardiography about finding in RA and if further imaging or change in AC is needed (risk of bleeding if transition to heparin as took Xarelto today)  - Tele    Please see attending attestation for final recommendations        Macho Spencer MD  Cardiology Fellow     All Cardiology service information can be found 24/7 on [email or web address], password: [facility] 89 M PMH tachybradycardia syndrome s/p PPM (initial pacemaker implantation on January 4, 2011, Medtronic), pAFIB (on xarelto), HTN, HLD.  Presents with asx. AF w/ RVR and abnormal TTE. He feels well with no complaints. TTE c/f possible RA mass/possible thrombus. His PPM is functioning well based on last remote report from December 2023.      Recommendations  - Initiate Lopressor 25 mg Q12H for rate control - can uptitrate as needed  - Patient states he no longer takes amiodarone - will hold   - Continue Xarelto for now - will discuss with echocardiography about finding in RA and if further imaging or change in AC is needed (risk of bleeding if transition to heparin as took Xarelto today)  - Tele    Macho Spencer MD  Cardiology Fellow     Plan discussed with cardiology fellow.  Patient seen and examined.  Hx., exam and labs as above.  I agree with the assessment and recommendations, which I have reviewed and edited where appropriate.  Wagner Chavira M.D.  Cardiology Attending, Consult Service    For Cardiology consults and questions, all Cardiology service information can be found 24/7 on [email or web address] - use password: [email or web address] to log in.

## 2024-03-20 NOTE — H&P ADULT - HISTORY OF PRESENT ILLNESS
89-year-old male history of hypertension, hyperlipidemia, paroxysmal A-fib on Eliquis presenting to the emergency department for the evaluation of abnormal echo.    Patient went to a routine follow-up with Dr. Gaspar today and had an echo and was advised there was an abnormality and he should present to the emergency department.

## 2024-03-20 NOTE — CONSULT NOTE ADULT - SUBJECTIVE AND OBJECTIVE BOX
Patient seen and evaluated at bedside      HPI:  89M PMH tachybradycardia syndrome status post pacemaker placement (initial pacemaker implantation on January 4, 2011, medtronic), pAFIB (on xarelto), HTN, HLD, presents for AF w/ RVR and abnormal TTE.   Patient presented for routine TTE, where he was in asymptomatic AF w/ RVR and c/f possible thrombus on RA lead.     PMHx:   HTN (hypertension)  HLD (hyperlipidemia)  Paroxysmal atrial fibrillation  CAD (coronary artery disease)  Gout  Lung nodule  Asbestos exposure  Paroxysmal atrial fibrillation  TB (tuberculosis)  Skin cancer        PSHx:   History of pacemaker        Allergies:  No Known Allergies      Home Meds:    Current Medications:       FAMILY HISTORY:      Social History:  Smoking History:  Alcohol Use:  Drug Use:    REVIEW OF SYSTEMS:  CONSTITUTIONAL: No weakness, fevers or chills  EYES/ENT: No visual changes;  No dysphagia  NECK: No pain or stiffness  RESPIRATORY: No cough, wheezing, hemoptysis; No shortness of breath  CARDIOVASCULAR: No chest pain or palpitations; No lower extremity edema  GASTROINTESTINAL: No abdominal or epigastric pain. No nausea, vomiting, or hematemesis; No diarrhea or constipation. No melena or hematochezia.  BACK: No back pain  GENITOURINARY: No dysuria, frequency or hematuria  NEUROLOGICAL: No numbness or weakness  SKIN: No itching, burning, rashes, or lesions   All other review of systems is negative unless indicated above.    Physical Exam:  T(F): 97.7 (03-20), Max: 97.7 (03-20)  HR: 110 (03-20) (109 - 110)  BP: 157/93 (03-20) (135/85 - 157/93)  RR: 17 (03-20)  SpO2: 97% (03-20)  Appearance: No acute distress; well appearing  Eyes:  EOMI  HEENT: Normal oral mucosa  Cardiovascular: RRR, S1, S2, no murmurs, rubs, or gallops; no edema; no JVD  Respiratory: Clear to auscultation bilaterally  Gastrointestinal: soft, non-tender, non-distended  Extremities: no lower extremity edema   Neurologic: Non-focal  Psychiatry: AAOx3, mood & affect appropriate    Cardiovascular Diagnostic Testing:    ECG:     Echo:     Stress Testing:    Cath:    Imaging:    CXR:     Labs: Personally reviewed                        14.0   7.31  )-----------( 185      ( 20 Mar 2024 16:29 )             42.5                                Patient seen and evaluated at bedside      HPI:  89M PMH tachybradycardia syndrome status post pacemaker placement (initial pacemaker implantation on January 4, 2011, medtronic), pAFIB (on xarelto), HTN, HLD, presents for AF w/ RVR and abnormal TTE.   Patient presented for routine TTE, where he was in asymptomatic AF w/ RVR and c/f possible thrombus on RA lead.   He feels well with no complaints. He has not missed any doses of Xarelto     PMHx:   HTN (hypertension)  HLD (hyperlipidemia)  Paroxysmal atrial fibrillation  CAD (coronary artery disease)  Gout  Lung nodule  Asbestos exposure  Paroxysmal atrial fibrillation  TB (tuberculosis)  Skin cancer        PSHx:   History of pacemaker        Allergies:  No Known Allergies        REVIEW OF SYSTEMS:  All other review of systems is negative unless indicated above.    Physical Exam:  T(F): 97.7 (03-20), Max: 97.7 (03-20)  HR: 110 (03-20) (109 - 110)  BP: 157/93 (03-20) (135/85 - 157/93)  RR: 17 (03-20)  SpO2: 97% (03-20)  Appearance: No acute distress; well appearing  Eyes:  EOMI  HEENT: Normal oral mucosa  Cardiovascular: IRR, S1, S2, no murmurs, rubs, or gallops; no edema; no JVD  Respiratory: Clear to auscultation bilaterally  Gastrointestinal: soft, non-tender, non-distended  Extremities: no lower extremity edema   Neurologic: Non-focal  Psychiatry: AAOx3, mood & affect appropriate    Cardiovascular Diagnostic Testing:    ECG:   AF  INC LBBB    Echo:   CONCLUSIONS:      1. Left ventricular systolic function is severely decreased with an ejection fraction of 27 % by Colvin's method of disks.   2. Multiple segmental abnormalitiesexist. See findings.   3. Normal right ventricular cavity size, with normal wall thickness, and normal systolic function.   4. The left atrium is mildly dilated.   5. The right atrium is dilated.   6. There is a device lead seen in the right atrium. A mass is noted on the right atrial portion of the device lead, this may represent a thrombus.   7. Estimated pulmonary artery systolic pressure is 28 mmHg.   8. Compared to the transthoracic echocardiogram performed on 10/29/2018, there has been an interval decline in LVER and the RA mass is new. . Findings were discussed with Dr. Gaspar on 3/20/2024 at 12pm.        Labs: Personally reviewed                        14.0   7.31  )-----------( 185      ( 20 Mar 2024 16:29 )             42.5                              Patient seen and evaluated at bedside      HPI:  88 yo M with hx. of tachy-bradycardia syndrome s/p pacemaker placement (initial pacemaker implantation on January 4, 2011,  [employer]tronic), pAFIB (on xarelto), HTN and HLD, now presents for AF w/ RVR and abnormal TTE.   Patient presented for routine TTE, where he was in asymptomatic AF w/ RVR and c/f possible thrombus on RA lead.   He feels well with no complaints. He has not missed any doses of Xarelto       PMHx:   HTN (hypertension)  HLD (hyperlipidemia)  Paroxysmal atrial fibrillation  CAD (coronary artery disease)  Gout  Lung nodule  Asbestos exposure  Paroxysmal atrial fibrillation  TB (tuberculosis)  Skin cancer      PSHx:   History of pacemaker      Allergies:  No Known Allergies    Home Medications:  allopurinol 300 mg oral tablet: 1 tab(s) orally once a day (20 Mar 2024 18:10)  amlodipine-benazepril 5 mg-20 mg oral capsule: 1 cap(s) orally once a day (20 Mar 2024 18:10)  rosuvastatin 5 mg oral tablet: 1 tab(s) orally once a day (20 Mar 2024 18:10)  Xarelto 20 mg oral tablet: 1 tab(s) orally once a day (20 Mar 2024 18:10)      Social Hx:  Non-smoker; no ETOH    Family Hx:  no hx. of heart dz.      ROS:  General: no fatigue/malaise, weight loss/gain.  Skin: no rashes.  Eyes: no blurred vision, no loss of vision. 	  ENT: no sore throat, rhinorrhea, sinus congestion.  Respiratory: no SOB, cough or wheeze.  Cardiovascular: no chest pain, dyspnea, palpitations, orthopnea or paroxysmal nocturnal dyspnea.   Gastrointestinal:  no N/V/D, no melena/hematemesis/hematochezia.  Genitourinary: no dysuria/hesitancy or hematuria.  Musculoskeletal: no myalgias or arthralgias.  Neurological: no changes in vision or hearing, no lightheadedness/dizziness, no syncope/near syncope	  Psychiatric: no unusual stress/anxiety.   Hematology/Lymphatics: no unusual bleeding, bruising and no lymphadenopathy.  All others negative except as stated above and in HPI.       Physical Exam:  T(F): 97.7 (03-20), Max: 97.7 (03-20)  HR: 110 (03-20) (109 - 110)  BP: 157/93 (03-20) (135/85 - 157/93)  RR: 17 (03-20)  SpO2: 97% (03-20)    Appearance: No acute distress; well appearing  Eyes:  EOMI  HEENT: Normal oral mucosa  Cardiovascular: IRR, S1, S2, no murmurs, rubs, or gallops; no edema; no JVD  Respiratory: Clear to auscultation bilaterally  Gastrointestinal: soft, non-tender, non-distended  Extremities: no lower extremity edema   Neurologic: Non-focal  Psychiatry: AAOx3, mood & affect appropriate      EKG:   AF with rapid VR; INC LBBB      Echo:   CONCLUSIONS:   1. Left ventricular systolic function is severely decreased with an ejection fraction of 27 % by Colvin's method of disks.   2. Multiple segmental abnormalities exist. See findings.   3. Normal right ventricular cavity size, with normal wall thickness, and normal systolic function.   4. The left atrium is mildly dilated.   5. The right atrium is dilated.   6. There is a device lead seen in the right atrium. A mass is noted on the right atrial portion of the device lead, this may represent a thrombus.   7. Estimated pulmonary artery systolic pressure is 28 mmHg.   8. Compared to the transthoracic echocardiogram performed on 10/29/2018, there has been an interval decline in LVER and the RA mass is new. . Findings were discussed with Dr. Gaspar on 3/20/2024 at 12pm.        Labs:                       14.0   7.31  )-----------( 185      ( 20 Mar 2024 16:29 )             42.5     03-20  142  |  104  |  19  ----------------------------<  112<H>  3.9   |  25  |  0.94    Ca    9.9      20 Mar 2024 16:29    TPro  7.5  /  Alb  4.8  /  TBili  0.8  /  DBili  x   /  AST  29  /  ALT  13  /  AlkPhos  54  03-20

## 2024-03-20 NOTE — ED ADULT NURSE REASSESSMENT NOTE - NS ED NURSE REASSESS COMMENT FT1
Received report from Carli Chacon RN. Pt is awake, alert, and speaking in full coherent sentences. VS stable. NAD noted. Pt is resting comfortably in stretcher, side rails up and bed in lowest position. Pt is admitted and awaiting bed.

## 2024-03-20 NOTE — H&P ADULT - ASSESSMENT
89-year-old male history of hypertension, hyperlipidemia, paroxysmal A-fib on Eliquis presenting to the emergency department for the evaluation of abnormal echo.  Patient went to a routine follow-up with Dr. Gaspar today and had an echo and was advised there was an abnormality and he should present to the emergency department.  The patient is denying any chest pain or shortness of breath and has been feeling in his usual state of health. he reports compliance with his medications 89-year-old male history of hypertension, hyperlipidemia, paroxysmal A-fib on Eliquis presenting to the emergency department for the evaluation of abnormal echo.  Patient went to a routine follow-up with Dr. Gaspar today and had an echo and was advised there was an abnormality and he should present to the emergency department.  The patient is denying any chest pain or shortness of breath and has been feeling in his usual state of health. 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

## 2024-03-20 NOTE — ED PROVIDER NOTE - OTHER FINDINGS
ECG recorded at 1507 independently interpreted by me , Dr Shelton Buitrago,  at 1546 shows atrial fibrillation left axis deviation IVCD no acute diagnostic ischemic findings.  Compared with ECG dated 4/2022, A-fib appears new and pacing spikes are not seen

## 2024-03-20 NOTE — ED PROVIDER NOTE - OBJECTIVE STATEMENT
89-year-old male history of hypertension, hyperlipidemia, paroxysmal A-fib on Eliquis presenting to the emergency department for the evaluation of abnormal echo.  Patient went to a routine follow-up with Dr. Gaspar today and had an echo and was advised there was an abnormality and he should present to the emergency department.  The patient is denying any chest pain or shortness of breath and has been feeling in his usual state of health. he reports compliance with his medications.

## 2024-03-20 NOTE — ED CLERICAL - NS ED CLERK NOTE PRE-ARRIVAL INFORMATION; ADDITIONAL PRE-ARRIVAL INFORMATION
Call from Dr. España in echo lab ([phone]) referring patient with hx of a-fib with RVR for newly reduced EF, already paged MD Macho Gautam to consult

## 2024-03-20 NOTE — ED ADULT NURSE NOTE - NSFALLUNIVINTERV_ED_ALL_ED
Bed/Stretcher in lowest position, wheels locked, appropriate side rails in place/Call bell, personal items and telephone in reach/Instruct patient to call for assistance before getting out of bed/chair/stretcher/Non-slip footwear applied when patient is off stretcher/[place] to call system/Physically safe environment - no spills, clutter or unnecessary equipment/Purposeful proactive rounding/Room/bathroom lighting operational, light cord in reach

## 2024-03-20 NOTE — ED PROVIDER NOTE - ATTENDING APP SHARED VISIT CONTRIBUTION OF CARE
Attending MD Buitrago: I personally made/approved the management plan and take responsibility for the patient management.        89-year-old gentleman with a history of atrial fibrillation on Xarelto and amiodarone is presenting at the request of his outpatient cardiologist, Dr. Razo, after the patient underwent an outpatient echocardiogram today.  Patient himself is not certain what the abnormality was on the echocardiogram that prompted recommendation for ED presentation.  He states he feels overall well today.  He denies specifically any chest pain shortness of breath nausea vomiting or diaphoresis.  When asked if he has leg swelling he states "sometimes".  Is not particularly worse today.    Patient's vital signs are notable for heart rate 109 blood pressure within normal limits oxygen saturation normal.  Patient is well-appearing sitting in the stretcher in no apparent distress.  The heart is irregularly irregular without any obvious murmur.  There is diminished breath sounds on the right with respect to the left.  No wheezes rales or rhonchi.  Trace pitting edema bilateral ankles symmetric extremities are warm to the touch patient moves all extremity spontaneously.    Patient is presenting for evaluation of reportedly abnormal echocardiogram, details of abnormality are unclear at this time.  Patient is in rapid atrial fibrillation in the low 100s here, does have diminished breath sounds on the right.  Will obtain cardiology consultation to clarify abnormality seen on echocardiogram and will follow recommendations of cardiology service      *The above represents an initial assessment/impression. Please refer to progress notes for potential changes in patient clinical course*

## 2024-03-20 NOTE — ED ADULT TRIAGE NOTE - CHIEF COMPLAINT QUOTE
abnormal TTE, MD wants repeat. Afib and cardiomyopathy. PMH pacemaker abnormal TTE, MD wants repeat. Afib and cardiomyopathy. PMH pacemaker. denies CP

## 2024-03-20 NOTE — ED ADULT NURSE REASSESSMENT NOTE - NS ED NURSE REASSESS COMMENT FT1
1757 Lopressor given as ordered pt denies c/o at this time wife at bedside I the hallway Pending admission Christina

## 2024-03-20 NOTE — ED PROVIDER NOTE - PROGRESS NOTE DETAILS
cardiology at bedside, not entirely sure this is a thrombus, will not change ac yet, will discuss with inpatient team, patient took eliquis this morning. would like to start with oral metoprolol for rate control, currently 110-125 and asymptomatic

## 2024-03-20 NOTE — ED PROVIDER NOTE - NS_EDPROVIDERDISPOUSERTYPE_ED_A_ED
Detail Level: Simple Comment: Patient’s Psoraisis remains well controlled, will continue with Enbrel. \\n\\nDiscussed risk vs benefits of continuation of biologic with COVID-19. Reviewed importance f social distancing, wearing a mask and overall precautions.\\n\\nPending unremarkable labs, plan to send 6 month refills. Attending Attestation (For Attendings USE Only)...

## 2024-03-20 NOTE — ED ADULT NURSE NOTE - OBJECTIVE STATEMENT
89 y/o male with PMH HTN, HLD, afib, pacemaker presenting to ED for abnormal echo today. pt reports having his routine echocardiogram, and was contacted to come to ED due to "possible mass in heart chamber." Upon exam pt A&Ox3, no difficulty speaking in complete sentences, s1s2 heart sounds heard, pulses x 4, hollings[place] x4, abdomen soft nondistended, skin intact. pt denies chest pain, sob, ha, n/v/d, abdominal pain, f/c, urinary symptoms, hematuria. Placed on CM, afib 110s.

## 2024-03-20 NOTE — ED ADULT NURSE NOTE - CCCP TRG CHIEF CMPLNT
abnormal lab result

## 2024-03-21 LAB
A1C WITH ESTIMATED AVERAGE GLUCOSE RESULT: 5.7 % — HIGH (ref 4–5.6)
ANION GAP SERPL CALC-SCNC: 13 MMOL/L — SIGNIFICANT CHANGE UP (ref 5–17)
BUN SERPL-MCNC: 22 MG/DL — SIGNIFICANT CHANGE UP (ref 7–23)
CALCIUM SERPL-MCNC: 9.3 MG/DL — SIGNIFICANT CHANGE UP (ref 8.4–10.5)
CHLORIDE SERPL-SCNC: 104 MMOL/L — SIGNIFICANT CHANGE UP (ref 96–108)
CO2 SERPL-SCNC: 26 MMOL/L — SIGNIFICANT CHANGE UP (ref 22–31)
CREAT SERPL-MCNC: 0.92 MG/DL — SIGNIFICANT CHANGE UP (ref 0.5–1.3)
EGFR: 80 ML/MIN/1.73M2 — SIGNIFICANT CHANGE UP
ESTIMATED AVERAGE GLUCOSE: 117 MG/DL — HIGH (ref 68–114)
GLUCOSE SERPL-MCNC: 78 MG/DL — SIGNIFICANT CHANGE UP (ref 70–99)
HCT VFR BLD CALC: 39.4 % — SIGNIFICANT CHANGE UP (ref 39–50)
HGB BLD-MCNC: 12.9 G/DL — LOW (ref 13–17)
MCHC RBC-ENTMCNC: 30.1 PG — SIGNIFICANT CHANGE UP (ref 27–34)
MCHC RBC-ENTMCNC: 32.7 GM/DL — SIGNIFICANT CHANGE UP (ref 32–36)
MCV RBC AUTO: 92.1 FL — SIGNIFICANT CHANGE UP (ref 80–100)
NRBC # BLD: 0 /100 WBCS — SIGNIFICANT CHANGE UP (ref 0–0)
PLATELET # BLD AUTO: 172 K/UL — SIGNIFICANT CHANGE UP (ref 150–400)
POTASSIUM SERPL-MCNC: 3.6 MMOL/L — SIGNIFICANT CHANGE UP (ref 3.5–5.3)
POTASSIUM SERPL-SCNC: 3.6 MMOL/L — SIGNIFICANT CHANGE UP (ref 3.5–5.3)
RBC # BLD: 4.28 M/UL — SIGNIFICANT CHANGE UP (ref 4.2–5.8)
RBC # FLD: 14.7 % — HIGH (ref 10.3–14.5)
SODIUM SERPL-SCNC: 143 MMOL/L — SIGNIFICANT CHANGE UP (ref 135–145)
TSH SERPL-MCNC: 2.94 UIU/ML — SIGNIFICANT CHANGE UP (ref 0.27–4.2)
WBC # BLD: 5.65 K/UL — SIGNIFICANT CHANGE UP (ref 3.8–10.5)
WBC # FLD AUTO: 5.65 K/UL — SIGNIFICANT CHANGE UP (ref 3.8–10.5)

## 2024-03-21 RX ORDER — AMIODARONE HYDROCHLORIDE 400 MG/1
200 TABLET ORAL DAILY
Refills: 0 | Status: DISCONTINUED | OUTPATIENT
Start: 2024-03-21 | End: 2024-03-22

## 2024-03-21 RX ADMIN — Medication 300 MILLIGRAM(S): at 12:17

## 2024-03-21 RX ADMIN — Medication 25 MILLIGRAM(S): at 05:41

## 2024-03-21 RX ADMIN — ATORVASTATIN CALCIUM 20 MILLIGRAM(S): 80 TABLET, FILM COATED ORAL at 21:42

## 2024-03-21 RX ADMIN — AMLODIPINE BESYLATE 5 MILLIGRAM(S): 2.5 TABLET ORAL at 05:41

## 2024-03-21 RX ADMIN — AMIODARONE HYDROCHLORIDE 200 MILLIGRAM(S): 400 TABLET ORAL at 17:32

## 2024-03-21 RX ADMIN — RIVAROXABAN 20 MILLIGRAM(S): KIT at 17:32

## 2024-03-21 NOTE — PROGRESS NOTE ADULT - SUBJECTIVE AND OBJECTIVE BOX
Patient seen and examined at bedside.    Overnight Events:   No longer in AF - now A paced  Feels well     REVIEW OF SYSTEMS:  All other review of systems is negative unless indicated above.            Current Meds:  allopurinol 300 milliGRAM(s) Oral daily  amLODIPine   Tablet 5 milliGRAM(s) Oral daily  atorvastatin 20 milliGRAM(s) Oral at bedtime  metoprolol tartrate 25 milliGRAM(s) Oral two times a day  rivaroxaban 20 milliGRAM(s) Oral with dinner      Vitals:  T(F): 97.8 (03-21), Max: 97.9 (03-20)  HR: 76 (03-21) (71 - 109)  BP: 143/87 (03-21) (123/79 - 143/87)  RR: 18 (03-21)  SpO2: 98% (03-21)  I&O's Summary      Physical Exam:  Appearance: No acute distress; well appearing  Eyes:  EOMI  HEENT: Normal oral mucosa  Cardiovascular: RRR, S1, S2, no murmurs, rubs, or gallops; no edema; no JVD  Respiratory: Clear to auscultation bilaterally  Gastrointestinal: soft, non-tender, non-distended  Musculoskeletal: No clubbing;  Neurologic: Non-focal  Psychiatry: AAOx3, mood & affect appropriate                          12.9   5.65  )-----------( 172      ( 21 Mar 2024 07:07 )             39.4     03-21    143  |  104  |  22  ----------------------------<  78  3.6   |  26  |  0.92    Ca    9.3      21 Mar 2024 07:07    TPro  7.5  /  Alb  4.8  /  TBili  0.8  /  DBili  x   /  AST  29  /  ALT  13  /  AlkPhos  54  03-20    PT/INR - ( 20 Mar 2024 16:29 )   PT: 24.9 sec;   INR: 2.43 ratio         PTT - ( 20 Mar 2024 16:29 )  PTT:40.8 sec

## 2024-03-21 NOTE — PROGRESS NOTE ADULT - ASSESSMENT
89 M PMH tachybradycardia syndrome s/p PPM (initial pacemaker implantation on January 4, 2011, [employer]tr[employer]), pAFIB (on xarelto), HTN, HLD.  Presents with asx. AF w/ RVR and abnormal TTE. He feels well with no complaints. TTE c/f possible RA mass/possible thrombus. His PPM is functioning well based on last remote report from December 2023.      Recommendations  - Case reviewed with his outpatient cardioloist and echo attending  - Resume home amiodarone 200 mg daily   - discontinue metoprolol   - Continue Xarelto for now   - Plan for URSULA on Friday to assess RA abnormality  - NPO midnight for URSULA  - Tele    Please see attending attestation for final recommendations        Macho Spencer MD  Cardiology Fellow     All Cardiology service information can be found 24/7 on [email or web address], password: [email or web address]

## 2024-03-21 NOTE — PROGRESS NOTE ADULT - ASSESSMENT
89-year-old male history of hypertension, hyperlipidemia, paroxysmal A-fib on Eliquis presenting to the emergency department for the evaluation of abnormal echo.  Patient went to a routine follow-up with Dr. Gaspar today and had an echo and was advised there was an abnormality and he should present to the emergency department.  The patient is denying any chest pain or shortness of breath and has been feeling in his usual state of health. 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

## 2024-03-21 NOTE — PROGRESS NOTE ADULT - SUBJECTIVE AND OBJECTIVE BOX
Patient is a 89y old  Male who presents with a chief complaint of Referred by cards for abnormal Echo (21 Mar 2024 16:38)      SUBJECTIVE / OVERNIGHT EVENTS:     MEDICATIONS  (STANDING):  allopurinol 300 milliGRAM(s) Oral daily  aMIOdarone    Tablet 200 milliGRAM(s) Oral daily  amLODIPine   Tablet 5 milliGRAM(s) Oral daily  atorvastatin 20 milliGRAM(s) Oral at bedtime  rivaroxaban 20 milliGRAM(s) Oral with dinner    MEDICATIONS  (PRN):      CAPILLARY BLOOD GLUCOSE        I&O's Summary    T(C): 36.4 (03-21-24 @ 22:13), Max: 36.6 (03-21-24 @ 11:13)  HR: 66 (03-21-24 @ 22:13) (66 - 78)  BP: 153/75 (03-21-24 @ 22:13) (119/76 - 153/75)  RR: 18 (03-21-24 @ 22:13) (18 - 18)  SpO2: 99% (03-21-24 @ 22:13) (98% - 99%)    PHYSICAL EXAM:  GENERAL: NAD, well-developed  HEAD:  Atraumatic, Normocephalic  EYES: EOMI, PERRLA, conjunctiva and sclera clear  NECK: Supple, No JVD  CHEST/LUNG: Clear to auscultation bilaterally; No wheeze  HEART: Regular rate and rhythm; No murmurs, rubs, or gallops  ABDOMEN: Soft, Nontender, Nondistended; Bowel sounds present  EXTREMITIES:  2+ Peripheral Pulses, No clubbing, cyanosis, or edema  PSYCH: AAOx3  NEUROLOGY: non-focal  SKIN: No rashes or lesions    LABS:                        12.9   5.65  )-----------( 172      ( 21 Mar 2024 07:07 )             39.4     03-21    143  |  104  |  22  ----------------------------<  78  3.6   |  26  |  0.92    Ca    9.3      21 Mar 2024 07:07    TPro  7.5  /  Alb  4.8  /  TBili  0.8  /  DBili  x   /  AST  29  /  ALT  13  /  AlkPhos  54  03-20    PT/INR - ( 20 Mar 2024 16:29 )   PT: 24.9 sec;   INR: 2.43 ratio         PTT - ( 20 Mar 2024 16:29 )  PTT:40.8 sec      Urinalysis Basic - ( 21 Mar 2024 07:07 )    Color: x / Appearance: x / SG: x / pH: x  Gluc: 78 mg/dL / Ketone: x  / Bili: x / Urobili: x   Blood: x / Protein: x / Nitrite: x   Leuk Esterase: x / RBC: x / WBC x   Sq Epi: x / Non Sq Epi: x / Bacteria: x        RADIOLOGY & ADDITIONAL TESTS:    Imaging Personally Reviewed:    Consultant(s) Notes Reviewed:      Care Discussed with Consultants/Other Providers:

## 2024-03-22 ENCOUNTER — RESULT REVIEW (OUTPATIENT)
Age: 89
End: 2024-03-22

## 2024-03-22 ENCOUNTER — TRANSCRIPTION ENCOUNTER (OUTPATIENT)
Age: 89
End: 2024-03-22

## 2024-03-22 VITALS
OXYGEN SATURATION: 97 % | SYSTOLIC BLOOD PRESSURE: 105 MMHG | TEMPERATURE: 99 F | HEART RATE: 96 BPM | DIASTOLIC BLOOD PRESSURE: 66 MMHG | RESPIRATION RATE: 18 BRPM

## 2024-03-22 LAB
ANION GAP SERPL CALC-SCNC: 13 MMOL/L — SIGNIFICANT CHANGE UP (ref 5–17)
BUN SERPL-MCNC: 21 MG/DL — SIGNIFICANT CHANGE UP (ref 7–23)
CALCIUM SERPL-MCNC: 9.3 MG/DL — SIGNIFICANT CHANGE UP (ref 8.4–10.5)
CHLORIDE SERPL-SCNC: 105 MMOL/L — SIGNIFICANT CHANGE UP (ref 96–108)
CO2 SERPL-SCNC: 25 MMOL/L — SIGNIFICANT CHANGE UP (ref 22–31)
CREAT SERPL-MCNC: 0.92 MG/DL — SIGNIFICANT CHANGE UP (ref 0.5–1.3)
EGFR: 80 ML/MIN/1.73M2 — SIGNIFICANT CHANGE UP
GLUCOSE SERPL-MCNC: 97 MG/DL — SIGNIFICANT CHANGE UP (ref 70–99)
HCT VFR BLD CALC: 40.7 % — SIGNIFICANT CHANGE UP (ref 39–50)
HGB BLD-MCNC: 13.2 G/DL — SIGNIFICANT CHANGE UP (ref 13–17)
MCHC RBC-ENTMCNC: 29.8 PG — SIGNIFICANT CHANGE UP (ref 27–34)
MCHC RBC-ENTMCNC: 32.4 GM/DL — SIGNIFICANT CHANGE UP (ref 32–36)
MCV RBC AUTO: 91.9 FL — SIGNIFICANT CHANGE UP (ref 80–100)
NRBC # BLD: 0 /100 WBCS — SIGNIFICANT CHANGE UP (ref 0–0)
PLATELET # BLD AUTO: 182 K/UL — SIGNIFICANT CHANGE UP (ref 150–400)
POTASSIUM SERPL-MCNC: 3.6 MMOL/L — SIGNIFICANT CHANGE UP (ref 3.5–5.3)
POTASSIUM SERPL-SCNC: 3.6 MMOL/L — SIGNIFICANT CHANGE UP (ref 3.5–5.3)
RBC # BLD: 4.43 M/UL — SIGNIFICANT CHANGE UP (ref 4.2–5.8)
RBC # FLD: 14.7 % — HIGH (ref 10.3–14.5)
SODIUM SERPL-SCNC: 143 MMOL/L — SIGNIFICANT CHANGE UP (ref 135–145)
WBC # BLD: 6.71 K/UL — SIGNIFICANT CHANGE UP (ref 3.8–10.5)
WBC # FLD AUTO: 6.71 K/UL — SIGNIFICANT CHANGE UP (ref 3.8–10.5)

## 2024-03-22 PROCEDURE — 84484 ASSAY OF TROPONIN QUANT: CPT

## 2024-03-22 PROCEDURE — 80048 BASIC METABOLIC PNL TOTAL CA: CPT

## 2024-03-22 PROCEDURE — 93312 ECHO TRANSESOPHAGEAL: CPT

## 2024-03-22 PROCEDURE — 96360 HYDRATION IV INFUSION INIT: CPT

## 2024-03-22 PROCEDURE — 93312 ECHO TRANSESOPHAGEAL: CPT | Mod: 26

## 2024-03-22 PROCEDURE — 99232 SBSQ HOSP IP/OBS MODERATE 35: CPT | Mod: GC

## 2024-03-22 PROCEDURE — 93320 DOPPLER ECHO COMPLETE: CPT

## 2024-03-22 PROCEDURE — C8929: CPT

## 2024-03-22 PROCEDURE — 93280 PM DEVICE PROGR EVAL DUAL: CPT | Mod: 26

## 2024-03-22 PROCEDURE — 84443 ASSAY THYROID STIM HORMONE: CPT

## 2024-03-22 PROCEDURE — 93325 DOPPLER ECHO COLOR FLOW MAPG: CPT | Mod: 26

## 2024-03-22 PROCEDURE — 99285 EMERGENCY DEPT VISIT HI MDM: CPT

## 2024-03-22 PROCEDURE — 83036 HEMOGLOBIN GLYCOSYLATED A1C: CPT

## 2024-03-22 PROCEDURE — 93320 DOPPLER ECHO COMPLETE: CPT | Mod: 26

## 2024-03-22 PROCEDURE — 93325 DOPPLER ECHO COLOR FLOW MAPG: CPT

## 2024-03-22 PROCEDURE — 85610 PROTHROMBIN TIME: CPT

## 2024-03-22 PROCEDURE — 85730 THROMBOPLASTIN TIME PARTIAL: CPT

## 2024-03-22 PROCEDURE — 80053 COMPREHEN METABOLIC PANEL: CPT

## 2024-03-22 PROCEDURE — 85027 COMPLETE CBC AUTOMATED: CPT

## 2024-03-22 PROCEDURE — 85025 COMPLETE CBC W/AUTO DIFF WBC: CPT

## 2024-03-22 PROCEDURE — 71045 X-RAY EXAM CHEST 1 VIEW: CPT

## 2024-03-22 PROCEDURE — 83880 ASSAY OF NATRIURETIC PEPTIDE: CPT

## 2024-03-22 RX ORDER — AMIODARONE HYDROCHLORIDE 400 MG/1
1 TABLET ORAL
Qty: 30 | Refills: 0
Start: 2024-03-22 | End: 2024-04-20

## 2024-03-22 RX ORDER — ALPRAZOLAM 0.25 MG
0.25 TABLET ORAL ONCE
Refills: 0 | Status: DISCONTINUED | OUTPATIENT
Start: 2024-03-22 | End: 2024-03-22

## 2024-03-22 RX ORDER — ONDANSETRON 8 MG/1
4 TABLET, FILM COATED ORAL ONCE
Refills: 0 | Status: DISCONTINUED | OUTPATIENT
Start: 2024-03-22 | End: 2024-03-22

## 2024-03-22 RX ORDER — LANOLIN ALCOHOL/MO/W.PET/CERES
3 CREAM (GRAM) TOPICAL AT BEDTIME
Refills: 0 | Status: DISCONTINUED | OUTPATIENT
Start: 2024-03-22 | End: 2024-03-22

## 2024-03-22 RX ADMIN — AMIODARONE HYDROCHLORIDE 200 MILLIGRAM(S): 400 TABLET ORAL at 08:42

## 2024-03-22 RX ADMIN — RIVAROXABAN 20 MILLIGRAM(S): KIT at 19:00

## 2024-03-22 RX ADMIN — Medication 0.25 MILLIGRAM(S): at 01:45

## 2024-03-22 RX ADMIN — AMLODIPINE BESYLATE 5 MILLIGRAM(S): 2.5 TABLET ORAL at 08:43

## 2024-03-22 RX ADMIN — Medication 300 MILLIGRAM(S): at 13:58

## 2024-03-22 NOTE — PROGRESS NOTE ADULT - ASSESSMENT
89 M PMH tachybradycardia syndrome s/p PPM (initial pacemaker implantation on January 4, 2011, [employer]tr[employer]), pAFIB (on xarelto), HTN, HLD.  Presents with asx. AF w/ RVR and abnormal TTE. He feels well with no complaints. TTE c/f possible RA mass/possible thrombus. His PPM is functioning well based on last remote report from December 2023.      Recommendations  - Case reviewed with his outpatient cardioloist and echo attending  - Continue home amiodarone 200 mg daily   - Continue Xarelto for now   - Plan for URSULA today to assess RA abnormality  - NPO midnight for URSULA  - Tele    Please see attending attestation for final recommendations        Macho Spencer MD  Cardiology Fellow     All Cardiology service information can be found 24/7 on [email or web address], password: [email or web address] 89 M PMH tachy-bradycardia syndrome s/p PPM (initial pacemaker implantation on January 4, 2011, [employer]tr[employer]), pAFIB (on Xarelto), HTN, HLD.  Presents with asx. AF w/ RVR and abnormal TTE. He feels well with no complaints. TTE c/f possible RA mass/possible thrombus. His PPM is functioning well based on last remote report from December 2023.      Recommendations  - Resume amiodarone 200 mg daily; apparently, patient stopped this on his own.  - Continue Xarelto for now  - Plan for URSULA today to assess RA abnormality  - NPO midnight for URSULA  - continue tele    Macho Spencer MD  Cardiology Fellow     Wagner Chavira M.D.  Cardiology Attending, Consult Service    For Cardiology consults and questions, all Cardiology service information can be found 24/7 on [email or web address], password: [email or web address]  89 M PMH tachy-bradycardia syndrome s/p PPM (initial pacemaker implantation on January 4, 2011, [employer]tr[employer]), pAFIB (on Xarelto), HTN, HLD.  Presents with asx. AF w/ RVR and abnormal TTE. He feels well with no complaints. TTE c/f possible RA mass/possible thrombus. His PPM is functioning well based on last remote report from December 2023.      Recommendations  - Resume amiodarone 200 mg daily; apparently, patient stopped this on his own.  - Continue Xarelto for now  - Plan for URSULA today to assess RA abnormality - if negative, no further cardiac work-up planned  - NPO for URSULA  - continue tele    Macho Spencer MD  Cardiology Fellow     Wagner Chavira M.D.  Cardiology Attending, Consult Service    For Cardiology consults and questions, all Cardiology service information can be found 24/7 on [email or web address], password: [email or web address]

## 2024-03-22 NOTE — DISCHARGE NOTE PROVIDER - CARE PROVIDERS DIRECT ADDRESSES
[email or web address]

## 2024-03-22 NOTE — PROCEDURE NOTE - ADDITIONAL PROCEDURE DETAILS
Indication: AF burden/duration check    Estimated remaining battery longevity: ~8 years    Presenting rhythm: AF with VS, intermittent VPacing.   Pt is not PPM dependent.   AP 60.4%;  23.3%    Events:  - Current AF episode in progress since 3/21/24 ~1148pm (no onset of EGM available).   - Pt has had multiple paroxysmal episodes throughout March, some with rapid ventricular rates. Some episodes with AFL and p waves in blanking period, burden may not be accurate.   - brief episode of NSVT on 11/21/23 lasting ~6 beats    AT/AF burden 39.7% 3/20 1141    Above discussed with primary team.     - SUDHAKAR HardenC  [id number]

## 2024-03-22 NOTE — DISCHARGE NOTE NURSING/CASE MANAGEMENT/SOCIAL WORK - NSDCPEFALRISK_GEN_ALL_CORE
For information on Fall & Injury Prevention, visit: [email or web address] OR  [email or web address] OR  https://www.cdc.gov/steadi/patient.html

## 2024-03-22 NOTE — PROGRESS NOTE ADULT - SUBJECTIVE AND OBJECTIVE BOX
Patient seen and examined at bedside.    Overnight Events:   Back in AF, rate controlled  No acute complaints this AM     REVIEW OF SYSTEMS:  All other review of systems is negative unless indicated above.            Current Meds:  allopurinol 300 milliGRAM(s) Oral daily  aMIOdarone    Tablet 200 milliGRAM(s) Oral daily  amLODIPine   Tablet 5 milliGRAM(s) Oral daily  atorvastatin 20 milliGRAM(s) Oral at bedtime  melatonin 3 milliGRAM(s) Oral at bedtime PRN  rivaroxaban 20 milliGRAM(s) Oral with dinner      Vitals:  T(F): 97.6 (03-22), Max: 97.7 (03-22)  HR: 99 (03-22) (66 - 99)  BP: 110/74 (03-22) (104/67 - 153/75)  RR: 18 (03-22)  SpO2: 96% (03-22)  I&O's Summary    21 Mar 2024 07:01  -  22 Mar 2024 07:00  --------------------------------------------------------  IN: 120 mL / OUT: 0 mL / NET: 120 mL        Physical Exam:  Appearance: No acute distress; well appearing  Eyes:  EOMI  HEENT: Normal oral mucosa  Cardiovascular: IRR, S1, S2, no murmurs, rubs, or gallops; no edema; no JVD  Respiratory: Clear to auscultation bilaterally  Gastrointestinal: soft, non-tender, non-distended  Musculoskeletal: No clubbing;  Neurologic: Non-focal  Psychiatry: AAOx3, mood & affect appropriate                          13.2   6.71  )-----------( 182      ( 22 Mar 2024 09:11 )             40.7     03-22    143  |  105  |  21  ----------------------------<  97  3.6   |  25  |  0.92    Ca    9.3      22 Mar 2024 09:11    TPro  7.5  /  Alb  4.8  /  TBili  0.8  /  DBili  x   /  AST  29  /  ALT  13  /  AlkPhos  54  03-20    PT/INR - ( 20 Mar 2024 16:29 )   PT: 24.9 sec;   INR: 2.43 ratio         PTT - ( 20 Mar 2024 16:29 )  PTT:40.8 sec         Patient seen and examined at bedside.    Overnight Events:   Back in AF, rate controlled  No acute complaints this AM     REVIEW OF SYSTEMS:  All other review of systems is negative unless indicated above.            Current Meds:  allopurinol 300 milliGRAM(s) Oral daily  aMIOdarone    Tablet 200 milliGRAM(s) Oral daily  amLODIPine   Tablet 5 milliGRAM(s) Oral daily  atorvastatin 20 milliGRAM(s) Oral at bedtime  melatonin 3 milliGRAM(s) Oral at bedtime PRN  rivaroxaban 20 milliGRAM(s) Oral with dinner      Vitals:  T(F): 97.6 (03-22), Max: 97.7 (03-22)  HR: 99 (03-22) (66 - 99)  BP: 110/74 (03-22) (104/67 - 153/75)  RR: 18 (03-22)  SpO2: 96% (03-22)    Physical Exam:  Appearance: No acute distress; well appearing  Eyes:  EOMI  HEENT: Normal oral mucosa  Cardiovascular: IRR, S1, S2, no murmurs, rubs, or gallops; no edema; no JVD  Respiratory: Clear to auscultation bilaterally  Gastrointestinal: soft, non-tender, non-distended  Musculoskeletal: No clubbing;  Neurologic: Non-focal  Psychiatry: AAOx3, mood & affect appropriate      LABS                      13.2   6.71  )-----------( 182      ( 22 Mar 2024 09:11 )             40.7     03-22  143  |  105  |  21  ----------------------------<  97  3.6   |  25  |  0.92    Ca    9.3      22 Mar 2024 09:11    TPro  7.5  /  Alb  4.8  /  TBili  0.8  /  DBili  x   /  AST  29  /  ALT  13  /  AlkPhos  54  03-20    PT/INR - ( 20 Mar 2024 16:29 )   PT: 24.9 sec;   INR: 2.43 ratio    PTT - ( 20 Mar 2024 16:29 )  PTT:40.8 sec

## 2024-03-22 NOTE — DISCHARGE NOTE PROVIDER - HOSPITAL COURSE
HPI:  89-year-old male history of hypertension, hyperlipidemia, paroxysmal A-fib on Eliquis presenting to the emergency department for the evaluation of abnormal echo.    Patient went to a routine follow-up with Dr. Gaspar today and had an echo and was advised there was an abnormality and he should present to the emergency department.   (20 Mar 2024 18:48)    Hospital Course:89 M PMH tachybradycardia syndrome s/p PPM (initial pacemaker implantation on January 4, 2011, [employer]), pAFIB (on xarelto), HTN, HLD.  Presents with asx. AF w/ RVR and abnormal TTE. He feels well with no complaints. TTE c/f possible RA mass/possible thrombus. His PPM is functioning well based on last remote report from December 2023.  - Lopressor 25 mg Q12H for rate control - can uptitrate as needed  - Patient states he no longer takes amiodarone - will hold   - Continue Xarelto   -URSULA on 3/22/24****************************    Important Medication Changes and Reason:    Active or Pending Issues Requiring Follow-up:    Advanced Directives:   [ X] Full code  [ ] DNR  [ ] Hospice    Discharge Diagnoses:  Afib with RVR         HPI:  89-year-old male history of hypertension, hyperlipidemia, paroxysmal A-fib on Eliquis presenting to the emergency department for the evaluation of abnormal echo.    Patient went to a routine follow-up with Dr. Gaspar today and had an echo and was advised there was an abnormality and he should present to the emergency department.   (20 Mar 2024 18:48)    Hospital Course:89 M PMH tachybradycardia syndrome s/p PPM (initial pacemaker implantation on January 4, 2011, [employer]), pAFIB (on xarelto), HTN, HLD.  Presents with asx. AF w/ RVR and abnormal TTE. He feels well with no complaints. TTE c/f possible RA mass/possible thrombus. His PPM is functioning well based on last remote report from December 2023.  - Lopressor 25 mg Q12H for rate control - can uptitrate as needed  - Patient states he no longer takes amiodarone - will hold   - Continue Xarelto   -URSULA on 3/22/24: No mass or LV thrombua     Important Medication Changes and Reason:    Active or Pending Issues Requiring Follow-up:  Follow-up with your Primary Care Doctor and Cardiologist     Advanced Directives:   [ X] Full code  [ ] DNR  [ ] Hospice    Discharge Diagnoses:  Afib with RVR         HPI:  89-year-old male history of hypertension, hyperlipidemia, paroxysmal A-fib on Eliquis presenting to the emergency department for the evaluation of abnormal echo.    Patient went to a routine follow-up with Dr. Gaspar today and had an echo and was advised there was an abnormality and he should present to the emergency department.   (20 Mar 2024 18:48)    Hospital Course:89 M PMH tachybradycardia syndrome s/p PPM (initial pacemaker implantation on January 4, 2011, [employer]), pAFIB (on xarelto), HTN, HLD.  Presents with asx. AF w/ RVR and abnormal TTE. He feels well with no complaints. TTE c/f possible RA mass/possible thrombus. His PPM is functioning well based on last remote report from December 2023.  - Lopressor 25 mg Q12H for rate control - can uptitrate as needed  - Patient states he no longer takes amiodarone - will hold   - Continue Xarelto   -URSULA on 3/22/24: No mass or LV thrombus  No further inpatient cardiac w/u per Cardiology     Important Medication Changes and Reason:    Active or Pending Issues Requiring Follow-up:  Follow-up with your Primary Care Doctor and Cardiologist     Advanced Directives:   [ X] Full code  [ ] DNR  [ ] Hospice    Discharge Diagnoses:  Afib with RVR

## 2024-03-22 NOTE — DISCHARGE NOTE PROVIDER - NSDCCPCAREPLAN_GEN_ALL_CORE_FT
PRINCIPAL DISCHARGE DIAGNOSIS  Diagnosis: Atrial fibrillation and flutter  Assessment and Plan of Treatment: Take medication as directed  Follow-up with Cardiology     PRINCIPAL DISCHARGE DIAGNOSIS  Diagnosis: Atrial fibrillation and flutter  Assessment and Plan of Treatment: Atrial fibrillation is the most common heart rhythm problem.  The condition puts you at risk for has stroke and heart attack  You were started on blood thinners to prevent possible clot and stroke complications.   Monitor for any signs of bleeding and avoid injury while on this medication  If any bleeding persistent and symptomatic stop the medication and notify your doctor immediately.  It helps if you control your blood pressure, not drink more than 1-2 alcohol drinks per day, cut down on caffeine, getting treatment for over active thyroid gland, and get regular exercise  Call your doctor if you feel your heart racing or beating unusually, chest tightness or pain, lightheaded, faint, shortness of breath especially with exercise  It is important to take your heart medication as prescribed  You may be on anticoagulation which is very important to take as directed - you may need blood work to monitor drug levels  Follow-up with Cardiology

## 2024-03-22 NOTE — DISCHARGE NOTE PROVIDER - NSDCFUADDAPPT_GEN_ALL_CORE_FT
APPTS ARE READY TO BE MADE: [X ] YES    Best Family or Patient Contact (if needed):    Additional Information about above appointments (if needed):    1: Dr. Lombardi  2: Dr. Gaspar  3:     Other comments or requests:    APPTS ARE READY TO BE MADE: [X ] YES    Best Family or Patient Contact (if needed):    Additional Information about above appointments (if needed):    1: Dr. Lombardi  2: Dr. Gaspar  3:     Other comments or requests:       Provided patient with provider referral information, however patient prefers to schedule the appointments on their own.

## 2024-03-22 NOTE — DISCHARGE NOTE PROVIDER - CARE PROVIDER_API CALL
Aaliyah Gaspar  Interventional Cardiology  [address] [facility], [address] [place] [id number]  Phone: [phone]  Fax: [phone]  Follow Up Time: 1 week    Lombardi, Adrian Carlo  [address], [address]  [place] [address]  Phone: [phone]  Fax: [phone]  Follow Up Time: 1 week

## 2024-03-22 NOTE — DISCHARGE NOTE PROVIDER - NSDCMRMEDTOKEN_GEN_ALL_CORE_FT
allopurinol 300 mg oral tablet: 1 tab(s) orally once a day  amlodipine-benazepril 5 mg-20 mg oral capsule: 1 cap(s) orally once a day  rosuvastatin 5 mg oral tablet: 1 tab(s) orally once a day  Xarelto 20 mg oral tablet: 1 tab(s) orally once a day   allopurinol 300 mg oral tablet: 1 tab(s) orally once a day  amiodarone 200 mg oral tablet: 1 tab(s) orally once a day  amlodipine-benazepril 5 mg-20 mg oral capsule: 1 cap(s) orally once a day  rosuvastatin 5 mg oral tablet: 1 tab(s) orally once a day  Xarelto 20 mg oral tablet: 1 tab(s) orally once a day

## 2024-03-22 NOTE — DISCHARGE NOTE NURSING/CASE MANAGEMENT/SOCIAL WORK - PATIENT PORTAL LINK FT
You can access the FollowMyHealth Patient Portal offered by [facility] by registering at the following website: [email or web address]. By joining [employer]’s FollowMyHealth portal, you will also be able to view your health information using other applications (apps) compatible with our system.

## 2024-03-22 NOTE — DISCHARGE NOTE PROVIDER - NSDCFUSCHEDAPPT_GEN_ALL_CORE_FT
[facility] Physician [facility]  ELECTROPH [address]  Scheduled Appointment: 03/26/2024    Aaliyah Gaspar  [facility]  CARDIOLOGY [address]  Scheduled Appointment: 05/23/2024     Aaliyah Gaspar  [facility] Physician [facility]  CARDIOLOGY [address]  Scheduled Appointment: 05/23/2024    [facility] Physician [facility]  ELECTROPH [address]  Scheduled Appointment: 06/25/2024

## 2024-03-22 NOTE — DISCHARGE NOTE PROVIDER - PROVIDER TOKENS
PROVIDER:[TOKEN:[[id number]],FOLLOWUP:[1 week]],PROVIDER:[TOKEN:[[id number]],FOLLOWUP:[1 week]]

## 2024-03-22 NOTE — PRE-ANESTHESIA EVALUATION ADULT - NSANTHOSAYNRD_GEN_A_CORE
No. RALPH screening performed.  STOP BANG Legend: 0-2 = LOW Risk; 3-4 = INTERMEDIATE Risk; 5-8 = HIGH Risk

## 2024-03-22 NOTE — DISCHARGE NOTE NURSING/CASE MANAGEMENT/SOCIAL WORK - NSDCFUADDAPPT_GEN_ALL_CORE_FT
APPTS ARE READY TO BE MADE: [X ] YES    Best Family or Patient Contact (if needed):    Additional Information about above appointments (if needed):    1: Dr. Lombardi  2: Dr. Gaspar  3:     Other comments or requests:

## 2024-03-25 ENCOUNTER — NON-APPOINTMENT (OUTPATIENT)
Age: 89
End: 2024-03-25

## 2024-03-26 ENCOUNTER — APPOINTMENT (OUTPATIENT)
Dept: ELECTROPHYSIOLOGY | Facility: CLINIC | Age: 89
End: 2024-03-26
Payer: MEDICARE

## 2024-03-26 PROCEDURE — 93294 REM INTERROG EVL PM/LDLS PM: CPT

## 2024-03-26 PROCEDURE — 93296 REM INTERROG EVL PM/IDS: CPT

## 2024-03-27 NOTE — CHART NOTE - NSCHARTNOTEFT_GEN_A_CORE
Patient was outreached but did not answer. A voicemail was left for the patient to return our call [phone] and [phone]

## 2024-03-29 NOTE — CHART NOTE - NSCHARTNOTEFT_GEN_A_CORE
Patient was outreached but did not answer. A voicemail was left for the patient to return our call [phone], [phone] and  [phone]

## 2024-04-15 RX ORDER — AMIODARONE HYDROCHLORIDE 200 MG/1
200 TABLET ORAL
Qty: 90 | Refills: 1 | Status: DISCONTINUED | COMMUNITY
Start: 2021-09-08 | End: 2024-04-15

## 2024-04-25 ENCOUNTER — APPOINTMENT (OUTPATIENT)
Dept: CARDIOLOGY | Facility: CLINIC | Age: 89
End: 2024-04-25
Payer: MEDICARE

## 2024-04-25 ENCOUNTER — NON-APPOINTMENT (OUTPATIENT)
Age: 89
End: 2024-04-25

## 2024-04-25 VITALS
OXYGEN SATURATION: 98 % | TEMPERATURE: 97.5 F | WEIGHT: 155 LBS | DIASTOLIC BLOOD PRESSURE: 81 MMHG | BODY MASS INDEX: 22.96 KG/M2 | SYSTOLIC BLOOD PRESSURE: 138 MMHG | HEIGHT: 69 IN | HEART RATE: 91 BPM

## 2024-04-25 DIAGNOSIS — E78.5 HYPERLIPIDEMIA, UNSPECIFIED: ICD-10-CM

## 2024-04-25 DIAGNOSIS — I48.0 PAROXYSMAL ATRIAL FIBRILLATION: ICD-10-CM

## 2024-04-25 DIAGNOSIS — I42.8 OTHER CARDIOMYOPATHIES: ICD-10-CM

## 2024-04-25 DIAGNOSIS — Z79.899 OTHER LONG TERM (CURRENT) DRUG THERAPY: ICD-10-CM

## 2024-04-25 DIAGNOSIS — I10 ESSENTIAL (PRIMARY) HYPERTENSION: ICD-10-CM

## 2024-04-25 PROCEDURE — 99214 OFFICE O/P EST MOD 30 MIN: CPT

## 2024-04-25 PROCEDURE — 93000 ELECTROCARDIOGRAM COMPLETE: CPT

## 2024-04-25 RX ORDER — AMIODARONE HYDROCHLORIDE 200 MG/1
200 TABLET ORAL
Qty: 45 | Refills: 3 | Status: ACTIVE | COMMUNITY
Start: 2019-04-22 | End: 1900-01-01

## 2024-04-26 PROBLEM — I42.8 CARDIOMYOPATHY, NONISCHEMIC: Status: ACTIVE | Noted: 2024-04-26

## 2024-04-26 PROBLEM — Z79.899 ON AMIODARONE THERAPY: Status: ACTIVE | Noted: 2019-12-19

## 2024-04-26 NOTE — HISTORY OF PRESENT ILLNESS
[FreeTextEntry1] : Garth is returning  for a f/u  - recent admission reviewed  Feels fatigue   No  CP No palps - on Amio for rate control (again; had stopped it previously) No orthopnea or palps No LE edema

## 2024-04-26 NOTE — DISCUSSION/SUMMARY
[FreeTextEntry1] : Mr. 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

## 2024-06-24 ENCOUNTER — NON-APPOINTMENT (OUTPATIENT)
Age: 89
End: 2024-06-24

## 2024-06-25 ENCOUNTER — APPOINTMENT (OUTPATIENT)
Dept: ELECTROPHYSIOLOGY | Facility: CLINIC | Age: 89
End: 2024-06-25
Payer: MEDICARE

## 2024-06-25 PROCEDURE — 93296 REM INTERROG EVL PM/IDS: CPT

## 2024-06-25 PROCEDURE — 93294 REM INTERROG EVL PM/LDLS PM: CPT

## 2024-07-05 ENCOUNTER — NON-APPOINTMENT (OUTPATIENT)
Age: 89
End: 2024-07-05

## 2024-07-05 ENCOUNTER — APPOINTMENT (OUTPATIENT)
Dept: CARDIOLOGY | Facility: CLINIC | Age: 89
End: 2024-07-05
Payer: MEDICARE

## 2024-07-05 VITALS
DIASTOLIC BLOOD PRESSURE: 72 MMHG | WEIGHT: 155 LBS | HEART RATE: 94 BPM | TEMPERATURE: 98.1 F | OXYGEN SATURATION: 98 % | HEIGHT: 69 IN | SYSTOLIC BLOOD PRESSURE: 149 MMHG | BODY MASS INDEX: 22.96 KG/M2

## 2024-07-05 DIAGNOSIS — I42.8 OTHER CARDIOMYOPATHIES: ICD-10-CM

## 2024-07-05 DIAGNOSIS — I48.0 PAROXYSMAL ATRIAL FIBRILLATION: ICD-10-CM

## 2024-07-05 DIAGNOSIS — I10 ESSENTIAL (PRIMARY) HYPERTENSION: ICD-10-CM

## 2024-07-05 DIAGNOSIS — E78.5 HYPERLIPIDEMIA, UNSPECIFIED: ICD-10-CM

## 2024-07-05 PROCEDURE — 99214 OFFICE O/P EST MOD 30 MIN: CPT

## 2024-07-05 PROCEDURE — 93000 ELECTROCARDIOGRAM COMPLETE: CPT

## 2024-07-09 ENCOUNTER — NON-APPOINTMENT (OUTPATIENT)
Age: 89
End: 2024-07-09

## 2024-07-09 DIAGNOSIS — Z87.39 PERSONAL HISTORY OF OTHER DISEASES OF THE MUSCULOSKELETAL SYSTEM AND CONNECTIVE TISSUE: ICD-10-CM

## 2024-09-19 ENCOUNTER — APPOINTMENT (OUTPATIENT)
Dept: CV DIAGNOSITCS | Facility: HOSPITAL | Age: 89
End: 2024-09-19

## 2024-09-19 ENCOUNTER — OUTPATIENT (OUTPATIENT)
Dept: OUTPATIENT SERVICES | Facility: HOSPITAL | Age: 89
LOS: 1 days | End: 2024-09-19
Payer: MEDICARE

## 2024-09-19 ENCOUNTER — RESULT REVIEW (OUTPATIENT)
Age: 89
End: 2024-09-19

## 2024-09-19 DIAGNOSIS — I10 ESSENTIAL (PRIMARY) HYPERTENSION: ICD-10-CM

## 2024-09-19 DIAGNOSIS — Z95.0 PRESENCE OF CARDIAC PACEMAKER: Chronic | ICD-10-CM

## 2024-09-19 PROCEDURE — 76376 3D RENDER W/INTRP POSTPROCES: CPT

## 2024-09-19 PROCEDURE — 93306 TTE W/DOPPLER COMPLETE: CPT | Mod: 26

## 2024-09-19 PROCEDURE — 93306 TTE W/DOPPLER COMPLETE: CPT

## 2024-09-19 PROCEDURE — 93356 MYOCRD STRAIN IMG SPCKL TRCK: CPT

## 2024-09-19 PROCEDURE — 76376 3D RENDER W/INTRP POSTPROCES: CPT | Mod: 26

## 2024-09-24 ENCOUNTER — APPOINTMENT (OUTPATIENT)
Dept: ELECTROPHYSIOLOGY | Facility: CLINIC | Age: 89
End: 2024-09-24
Payer: MEDICARE

## 2024-09-24 ENCOUNTER — NON-APPOINTMENT (OUTPATIENT)
Age: 89
End: 2024-09-24

## 2024-09-24 PROCEDURE — 93296 REM INTERROG EVL PM/IDS: CPT

## 2024-09-24 PROCEDURE — 93294 REM INTERROG EVL PM/LDLS PM: CPT

## 2024-09-26 ENCOUNTER — APPOINTMENT (OUTPATIENT)
Dept: CARDIOLOGY | Facility: CLINIC | Age: 89
End: 2024-09-26
Payer: MEDICARE

## 2024-09-26 ENCOUNTER — NON-APPOINTMENT (OUTPATIENT)
Age: 89
End: 2024-09-26

## 2024-09-26 VITALS
WEIGHT: 155 LBS | SYSTOLIC BLOOD PRESSURE: 137 MMHG | DIASTOLIC BLOOD PRESSURE: 74 MMHG | HEART RATE: 90 BPM | BODY MASS INDEX: 22.96 KG/M2 | TEMPERATURE: 97.4 F | OXYGEN SATURATION: 98 % | HEIGHT: 69 IN

## 2024-09-26 DIAGNOSIS — I42.8 OTHER CARDIOMYOPATHIES: ICD-10-CM

## 2024-09-26 DIAGNOSIS — I48.0 PAROXYSMAL ATRIAL FIBRILLATION: ICD-10-CM

## 2024-09-26 DIAGNOSIS — Z79.899 OTHER LONG TERM (CURRENT) DRUG THERAPY: ICD-10-CM

## 2024-09-26 DIAGNOSIS — I10 ESSENTIAL (PRIMARY) HYPERTENSION: ICD-10-CM

## 2024-09-26 DIAGNOSIS — E78.5 HYPERLIPIDEMIA, UNSPECIFIED: ICD-10-CM

## 2024-09-26 PROCEDURE — 99214 OFFICE O/P EST MOD 30 MIN: CPT

## 2024-09-26 PROCEDURE — 93000 ELECTROCARDIOGRAM COMPLETE: CPT

## 2024-09-26 NOTE — DISCUSSION/SUMMARY
[FreeTextEntry1] : Mr. 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 3- f/u in 3 months  [EKG obtained to assist in diagnosis and management of assessed problem(s)] : EKG obtained to assist in diagnosis and management of assessed problem(s)

## 2024-09-26 NOTE — DISCUSSION/SUMMARY
[FreeTextEntry1] : Mr. 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 3- f/u in 3 months  [EKG obtained to assist in diagnosis and management of assessed problem(s)] : EKG obtained to assist in diagnosis and management of assessed problem(s)

## 2024-09-26 NOTE — HISTORY OF PRESENT ILLNESS
[FreeTextEntry1] : Garth is returning  for a f/u     Feels about the same No  CP No palps - on Amio for rate control     No orthopnea or palps No LE edema

## 2024-10-03 ENCOUNTER — APPOINTMENT (OUTPATIENT)
Dept: CARDIOLOGY | Facility: CLINIC | Age: 89
End: 2024-10-03

## 2024-10-23 ENCOUNTER — RX RENEWAL (OUTPATIENT)
Age: 89
End: 2024-10-23

## 2024-12-24 ENCOUNTER — NON-APPOINTMENT (OUTPATIENT)
Age: 88
End: 2024-12-24

## 2024-12-24 ENCOUNTER — APPOINTMENT (OUTPATIENT)
Dept: ELECTROPHYSIOLOGY | Facility: CLINIC | Age: 88
End: 2024-12-24
Payer: MEDICARE

## 2024-12-24 PROCEDURE — 93294 REM INTERROG EVL PM/LDLS PM: CPT

## 2024-12-24 PROCEDURE — 93296 REM INTERROG EVL PM/IDS: CPT

## 2024-12-26 ENCOUNTER — RX RENEWAL (OUTPATIENT)
Age: 88
End: 2024-12-26

## 2025-03-13 ENCOUNTER — APPOINTMENT (OUTPATIENT)
Dept: CARDIOLOGY | Facility: CLINIC | Age: 89
End: 2025-03-13
Payer: MEDICARE

## 2025-03-13 ENCOUNTER — NON-APPOINTMENT (OUTPATIENT)
Age: 89
End: 2025-03-13

## 2025-03-13 VITALS
OXYGEN SATURATION: 97 % | SYSTOLIC BLOOD PRESSURE: 119 MMHG | HEART RATE: 90 BPM | WEIGHT: 155 LBS | BODY MASS INDEX: 22.96 KG/M2 | HEIGHT: 69 IN | DIASTOLIC BLOOD PRESSURE: 83 MMHG

## 2025-03-13 DIAGNOSIS — I48.0 PAROXYSMAL ATRIAL FIBRILLATION: ICD-10-CM

## 2025-03-13 DIAGNOSIS — I42.8 OTHER CARDIOMYOPATHIES: ICD-10-CM

## 2025-03-13 DIAGNOSIS — E78.5 HYPERLIPIDEMIA, UNSPECIFIED: ICD-10-CM

## 2025-03-13 PROCEDURE — 93000 ELECTROCARDIOGRAM COMPLETE: CPT

## 2025-03-13 PROCEDURE — 99214 OFFICE O/P EST MOD 30 MIN: CPT

## 2025-03-25 ENCOUNTER — NON-APPOINTMENT (OUTPATIENT)
Age: 89
End: 2025-03-25

## 2025-03-25 ENCOUNTER — APPOINTMENT (OUTPATIENT)
Dept: ELECTROPHYSIOLOGY | Facility: CLINIC | Age: 89
End: 2025-03-25

## 2025-03-25 PROCEDURE — 93294 REM INTERROG EVL PM/LDLS PM: CPT

## 2025-03-25 PROCEDURE — 93296 REM INTERROG EVL PM/IDS: CPT

## 2025-06-04 ENCOUNTER — RX RENEWAL (OUTPATIENT)
Age: 89
End: 2025-06-04

## 2025-06-27 ENCOUNTER — APPOINTMENT (OUTPATIENT)
Dept: ELECTROPHYSIOLOGY | Facility: CLINIC | Age: 89
End: 2025-06-27

## 2025-06-27 PROCEDURE — 93296 REM INTERROG EVL PM/IDS: CPT

## 2025-06-27 PROCEDURE — 93294 REM INTERROG EVL PM/LDLS PM: CPT

## 2025-08-21 ENCOUNTER — APPOINTMENT (OUTPATIENT)
Dept: CARDIOLOGY | Facility: CLINIC | Age: 89
End: 2025-08-21
Payer: MEDICARE

## 2025-08-21 ENCOUNTER — NON-APPOINTMENT (OUTPATIENT)
Age: 89
End: 2025-08-21

## 2025-08-21 VITALS
TEMPERATURE: 98 F | SYSTOLIC BLOOD PRESSURE: 151 MMHG | HEIGHT: 69 IN | HEART RATE: 91 BPM | WEIGHT: 155 LBS | BODY MASS INDEX: 22.96 KG/M2 | OXYGEN SATURATION: 98 % | DIASTOLIC BLOOD PRESSURE: 82 MMHG

## 2025-08-21 DIAGNOSIS — I10 ESSENTIAL (PRIMARY) HYPERTENSION: ICD-10-CM

## 2025-08-21 DIAGNOSIS — I48.0 PAROXYSMAL ATRIAL FIBRILLATION: ICD-10-CM

## 2025-08-21 DIAGNOSIS — E78.5 HYPERLIPIDEMIA, UNSPECIFIED: ICD-10-CM

## 2025-08-21 PROCEDURE — 99214 OFFICE O/P EST MOD 30 MIN: CPT

## 2025-08-21 PROCEDURE — 93000 ELECTROCARDIOGRAM COMPLETE: CPT
